# Patient Record
Sex: MALE | Race: OTHER | HISPANIC OR LATINO | Employment: FULL TIME | URBAN - METROPOLITAN AREA
[De-identification: names, ages, dates, MRNs, and addresses within clinical notes are randomized per-mention and may not be internally consistent; named-entity substitution may affect disease eponyms.]

---

## 2019-01-24 ENCOUNTER — OCCUPATIONAL HEALTH (OUTPATIENT)
Dept: URGENT CARE | Facility: CLINIC | Age: 54
End: 2019-01-24

## 2019-01-24 PROCEDURE — 80305 DRUG TEST PRSMV DIR OPT OBS: CPT | Mod: S$GLB,,, | Performed by: NURSE PRACTITIONER

## 2019-01-24 PROCEDURE — 80305 PR NON-DOT DRUG SCREENS: ICD-10-PCS | Mod: S$GLB,,, | Performed by: NURSE PRACTITIONER

## 2019-06-17 ENCOUNTER — OCCUPATIONAL HEALTH (OUTPATIENT)
Dept: URGENT CARE | Facility: CLINIC | Age: 54
End: 2019-06-17

## 2019-06-17 DIAGNOSIS — Z02.1 PRE-EMPLOYMENT EXAMINATION: Primary | ICD-10-CM

## 2019-06-17 PROCEDURE — 80305 DRUG TEST PRSMV DIR OPT OBS: CPT | Mod: S$GLB,,, | Performed by: NURSE PRACTITIONER

## 2019-06-17 PROCEDURE — 99499 UNLISTED E&M SERVICE: CPT | Mod: S$GLB,,, | Performed by: NURSE PRACTITIONER

## 2019-06-17 PROCEDURE — 80305 PR NON-DOT DRUG SCREENS: ICD-10-PCS | Mod: S$GLB,,, | Performed by: NURSE PRACTITIONER

## 2019-06-17 PROCEDURE — 99499 PHYSICAL, BASIC COMPLEXITY: ICD-10-PCS | Mod: S$GLB,,, | Performed by: NURSE PRACTITIONER

## 2023-11-25 ENCOUNTER — OFFICE VISIT (OUTPATIENT)
Dept: URGENT CARE | Facility: CLINIC | Age: 58
End: 2023-11-25

## 2023-11-25 VITALS
HEIGHT: 68 IN | WEIGHT: 206.38 LBS | SYSTOLIC BLOOD PRESSURE: 137 MMHG | RESPIRATION RATE: 19 BRPM | OXYGEN SATURATION: 98 % | TEMPERATURE: 98 F | HEART RATE: 75 BPM | DIASTOLIC BLOOD PRESSURE: 74 MMHG | BODY MASS INDEX: 31.28 KG/M2

## 2023-11-25 DIAGNOSIS — I10 ELEVATED BLOOD PRESSURE READING WITH DIAGNOSIS OF HYPERTENSION: Primary | ICD-10-CM

## 2023-11-25 PROCEDURE — 99203 PR OFFICE/OUTPT VISIT, NEW, LEVL III, 30-44 MIN: ICD-10-PCS | Mod: TIER,S$GLB,, | Performed by: PHYSICIAN ASSISTANT

## 2023-11-25 PROCEDURE — 99203 OFFICE O/P NEW LOW 30 MIN: CPT | Mod: TIER,S$GLB,, | Performed by: PHYSICIAN ASSISTANT

## 2023-11-25 RX ORDER — NEOMYCIN SULFATE, POLYMYXIN B SULFATE AND DEXAMETHASONE 3.5; 10000; 1 MG/ML; [USP'U]/ML; MG/ML
1 SUSPENSION/ DROPS OPHTHALMIC 4 TIMES DAILY
COMMUNITY
Start: 2023-08-22

## 2023-11-25 RX ORDER — AMLODIPINE BESYLATE 5 MG/1
5 TABLET ORAL DAILY
Qty: 30 TABLET | Refills: 0 | Status: SHIPPED | OUTPATIENT
Start: 2023-11-25 | End: 2023-12-25

## 2023-11-25 NOTE — PATIENT INSTRUCTIONS
Recommend [facility] or [facility] who will provide services on a sliding scale.     Discussed amlodipine (norvasc).  Signs of an allergic reaction, like rash; hives; itching; red, swollen, blistered, or peeling skin with or without fever; wheezing; tightness in the chest or throat; trouble breathing, swallowing, or talking; unusual hoarseness; or swelling of the mouth, face, lips, tongue, or throat.  Signs of liver problems like dark urine, feeling tired, not hungry, upset stomach or stomach pain, light-colored stools, throwing up, or yellow skin or eyes.  Very bad dizziness or passing out.  Chest pain that is new or worse.  Fast or abnormal heartbeat.  Swelling.  Stiff muscles, shakiness, or muscle movements that are not normal.    If your blood pressure was elevated during your visit and this was discussed with you, please obtain a home blood pressure cuff and take your blood pressure once daily for 2-4 weeks, record values and follow up with your PCP. If you were started on blood pressure medication today, ensure you obtain a follow up appointment with your PCP in 5-7 days for a re-check, if you develop shortness of breath, severe headache, weakness, chest pain, vision problems after leaving urgent care, call 911 and go to the ER immediately.         Please remember that you have received care at an urgent care today. Urgent cares are not emergency rooms and are not equipped to handle life threatening emergencies and cannot rule in or out certain medical conditions and you may be released before all of your medical problems are known or treated. Please arrange follow up with your primary care physician or speciality clinic  within 2-5 days if your signs and symptoms have not resolved or worsen. Patient can call our Referral Hotline at [phone] to make an appointment.    Please return here or go to the Emergency Department for any concerns or worsening of condition.Patient was  educated on signs/symptoms that would warrant emergent medical attention. Patient verbalized understanding.  Signs of heart attack:  Chest pain  Pain in other areas of the upper body (either or both arms, jaw, neck, etc.)  Trouble breathing  Fast heartbeat  Feeling dizzy  Signs of stroke:  Sudden numbness or weakness of the face, arm, or leg, especially on one side of the body  Sudden confusion, trouble speaking or understanding  Sudden trouble seeing in one or both eyes  Sudden trouble walking, dizziness, loss of balance or coordination  Sudden severe headache with no known cause  Face droops on one side  Call your doctor if you have:  Blood pressure that is 20 points higher than your normal top or bottom number  Two blood pressure readings higher than 180/120  Very bad headache  Confusion  Sudden change in hearing or eyesight  Nosebleed

## 2023-11-25 NOTE — PROGRESS NOTES
"Subjective:      Patient ID: Shai Olvera is a 58 y.o. male.    Vitals:  height is 5' 8" (1.727 m) and weight is 93.6 kg (206 lb 5.6 oz). His oral temperature is 97.9 °F (36.6 °C). His blood pressure is 137/74 and his pulse is 75. His respiration is 19 and oxygen saturation is 98%.     Chief Complaint: Hypertension    Shai Olvera is a 58 y.o. male who complains of  concerns of high BP. Pt stated he has been on BP medication about 2 years ago, but his PCP  and he hasn't been on medication since. He was on his medication for 1 year. He was at [facility] this morning and his BP reading was 186/119 and he got concerned and came in today. He had an episode of blurry vision, dizzy and nausea on Thursday. Reports 2nd episode on Friday with headaches,dizziness, and nausea.       He is present with his wife who is helping to translate. Pt speaks english. They do not have a blood pressure monitor at home. Wife states she has been trying to get him to quit smoking. He smokes 1 pack a day.       Patient denies chest pain, SOB, HA, fever, chills, cough, N/V/D, diaphoresis, jaw pain, back pain, lower extremity pain/swelling, recent travel, recent surgery, recent prolonged immobilization. Pt denies experiencing any ripping or tearing sensation in the chest, back, abdomen or pelvis.      Hypertension  This is a new problem. The current episode started in the past 7 days. The problem is unchanged. The problem is uncontrolled. Associated symptoms include blurred vision and headaches. Pertinent negatives include no anxiety, chest pain, malaise/fatigue, neck pain, orthopnea, palpitations, peripheral edema, PND, shortness of breath or sweats. There are no associated agents to hypertension. Risk factors for coronary artery disease include smoking/tobacco exposure and male gender. Compliance problems include medication cost (PCP ).        Constitution: Negative for chills and fever.   HENT:  Negative for congestion, sore throat, " trouble swallowing and voice change.    Neck: Negative for neck pain and neck stiffness.   Cardiovascular:  Negative for chest pain, leg swelling, palpitations, sob on exertion and passing out.   Eyes:  Positive for blurred vision. Negative for eye discharge, eye redness, vision loss, double vision and eyelid swelling.   Respiratory:  Negative for sleep apnea, chest tightness, cough, sputum production, COPD, shortness of breath, wheezing and asthma.    Gastrointestinal:  Negative for abdominal pain, nausea and vomiting.   Genitourinary:  Negative for urine decreased.   Skin:  Negative for rash.   Allergic/Immunologic: Negative for environmental allergies, seasonal allergies, asthma and chronic cough.   Neurological:  Positive for dizziness, light-headedness and headaches. Negative for history of vertigo, passing out, facial drooping, speech difficulty, coordination disturbances, loss of balance, history of migraines, altered mental status, numbness, tingling and tremors.   Psychiatric/Behavioral:  Negative for altered mental status.       Objective:     Physical Exam   Constitutional: He is oriented to person, place, and time. He appears well-developed. He is cooperative.  Non-toxic appearance. He does not appear ill. No distress.      Comments:Patient is awake and alert, sitting up in exam chair, speaking and answering in complete sentences, in no signs of acute distress   normal  HENT:   Head: Normocephalic and atraumatic.   Ears:   Right Ear: Hearing, tympanic membrane, external ear and ear canal normal.   Left Ear: Hearing, tympanic membrane, external ear and ear canal normal.   Nose: Nose normal. No mucosal edema, rhinorrhea or nasal deformity. No epistaxis. Right sinus exhibits no maxillary sinus tenderness and no frontal sinus tenderness. Left sinus exhibits no maxillary sinus tenderness and no frontal sinus tenderness.   Mouth/Throat: Uvula is midline, oropharynx is clear and moist and mucous membranes are  normal. Mucous membranes are moist. No trismus in the jaw. Normal dentition. No uvula swelling. No oropharyngeal exudate or posterior oropharyngeal erythema. Tonsils are 2+ on the right. Tonsils are 2+ on the left. No tonsillar exudate. Oropharynx is clear.   Eyes: Conjunctivae and lids are normal. Pupils are equal, round, and reactive to light. Right eye exhibits no discharge. Left eye exhibits no discharge. No scleral icterus. Extraocular movement intact   Neck: Trachea normal and phonation normal. Neck supple.   Cardiovascular: Normal rate, regular rhythm, normal heart sounds and normal pulses.   Pulmonary/Chest: Effort normal and breath sounds normal. No respiratory distress.   Abdominal: Normal appearance.   Musculoskeletal: Normal range of motion.         General: No deformity. Normal range of motion.   Neurological: no focal deficit. He is alert and oriented to person, place, and time. He has normal motor skills, normal sensation and intact cranial nerves (2-12). He exhibits normal muscle tone. Gait and coordination normal. Coordination normal. GCS eye subscore is 4. GCS verbal subscore is 5. GCS motor subscore is 6.   Skin: Skin is warm, dry, intact, not diaphoretic and not pale.   Psychiatric: His speech is normal and behavior is normal. Mood, judgment and thought content normal.   Nursing note and vitals reviewed.chaperone present       Vision Screening    Right eye Left eye Both eyes   Without correction 20/30 20/25 20/25   With correction          Assessment:     1. Elevated blood pressure reading with diagnosis of hypertension      Patient presents with clinical exam findings and history consistent with above.      On exam, patient is nontoxic appearing and vitals are stable.        Diagnostic testing results were reviewed and discussed with patient/guardian.   Tests ordered in clinic: None  Previous progress notes/admissions/labs and medications were reviewed.      Plan:       Elevated blood pressure  "reading with diagnosis of hypertension  -     Ambulatory referral/consult to Internal Medicine  -     amLODIPine (NORVASC) 5 MG tablet; Take 1 tablet (5 mg total) by mouth once daily.  Dispense: 30 tablet; Refill: 0             Additional MDM:     Heart Failure Score:   COPD = No              1) See orders for this visit as documented in the electronic medical record.  2) Symptomatic therapy suggested: ,push fluids.   3) Call or return to clinic prn if these symptoms worsen or fail to improve as anticipated.    Discussed results/diagnosis/plan with patient in clinic.  We had shared decision making for patient's treatment. Patient verbalized understanding and in agreement with current treatment plan.     Patient was instructed to return for re-evaluation with urgent care or PCP for continued outpatient workup and management if symptoms do not improve/worsening symptoms. Strict ED versus clinic precautions given in depth.    Discharge and follow-up instructions given verbally/printed with the patient who expressed understanding. The instructions and results are also available on [employer].              Fabienne Cox PA-C (Jackie)          Patient Instructions   Recommend [facility] or [facility] who will provide services on a sliding scale.     Discussed amlodipine (norvasc).  Signs of an allergic reaction, like rash; hives; itching; red, swollen, blistered, or peeling skin with or without fever; wheezing; tightness in the chest or throat; trouble breathing, swallowing, or talking; unusual hoarseness; or swelling of the mouth, face, lips, tongue, or throat.  Signs of liver problems like dark urine, feeling tired, not hungry, upset stomach or stomach pain, light-colored stools, throwing up, or yellow skin or eyes.  Very bad dizziness or passing out.  Chest pain that is new or worse.  Fast or abnormal heartbeat.  Swelling.  Stiff muscles, shakiness, or muscle movements that are not normal.    If " your blood pressure was elevated during your visit and this was discussed with you, please obtain a home blood pressure cuff and take your blood pressure once daily for 2-4 weeks, record values and follow up with your PCP. If you were started on blood pressure medication today, ensure you obtain a follow up appointment with your PCP in 5-7 days for a re-check, if you develop shortness of breath, severe headache, weakness, chest pain, vision problems after leaving urgent care, call 911 and go to the ER immediately.         Please remember that you have received care at an urgent care today. Urgent cares are not emergency rooms and are not equipped to handle life threatening emergencies and cannot rule in or out certain medical conditions and you may be released before all of your medical problems are known or treated. Please arrange follow up with your primary care physician or speciality clinic  within 2-5 days if your signs and symptoms have not resolved or worsen. Patient can call our Referral Hotline at [phone] to make an appointment.    Please return here or go to the Emergency Department for any concerns or worsening of condition.Patient was educated on signs/symptoms that would warrant emergent medical attention. Patient verbalized understanding.  Signs of heart attack:  Chest pain  Pain in other areas of the upper body (either or both arms, jaw, neck, etc.)  Trouble breathing  Fast heartbeat  Feeling dizzy  Signs of stroke:  Sudden numbness or weakness of the face, arm, or leg, especially on one side of the body  Sudden confusion, trouble speaking or understanding  Sudden trouble seeing in one or both eyes  Sudden trouble walking, dizziness, loss of balance or coordination  Sudden severe headache with no known cause  Face droops on one side  Call your doctor if you have:  Blood pressure that is 20 points higher than your normal top or bottom number  Two blood pressure readings higher than 180/120  Very  bad headache  Confusion  Sudden change in hearing or eyesight  Nosebleed

## 2023-12-27 ENCOUNTER — TELEPHONE (OUTPATIENT)
Dept: INTERNAL MEDICINE | Facility: CLINIC | Age: 58
End: 2023-12-27
Payer: COMMERCIAL

## 2023-12-27 NOTE — TELEPHONE ENCOUNTER
Spoke with Pt wife. Stated that there was no available appointment. Our recommendation, with previous symptoms stated, is to go to ER or urgent care. Pt stated that he would like to establish with Dr Moe. They will call back when they would like to set up the appointment.

## 2024-01-10 ENCOUNTER — HOSPITAL ENCOUNTER (OUTPATIENT)
Dept: RADIOLOGY | Facility: HOSPITAL | Age: 59
Discharge: HOME | End: 2024-01-10
Attending: FAMILY MEDICINE
Payer: COMMERCIAL

## 2024-01-10 ENCOUNTER — OFFICE VISIT (OUTPATIENT)
Dept: FAMILY MEDICINE | Facility: CLINIC | Age: 59
End: 2024-01-10
Payer: COMMERCIAL

## 2024-01-10 VITALS
HEART RATE: 83 BPM | WEIGHT: 204.13 LBS | HEIGHT: 68 IN | OXYGEN SATURATION: 98 % | BODY MASS INDEX: 30.94 KG/M2 | DIASTOLIC BLOOD PRESSURE: 62 MMHG | SYSTOLIC BLOOD PRESSURE: 120 MMHG

## 2024-01-10 DIAGNOSIS — Z00.01 ENCOUNTER FOR GENERAL ADULT MEDICAL EXAMINATION WITH ABNORMAL FINDINGS: Primary | ICD-10-CM

## 2024-01-10 DIAGNOSIS — J06.9 VIRAL URI WITH COUGH: ICD-10-CM

## 2024-01-10 DIAGNOSIS — F17.200 TOBACCO USE DISORDER: ICD-10-CM

## 2024-01-10 DIAGNOSIS — H91.8X3 OTHER SPECIFIED HEARING LOSS OF BOTH EARS: ICD-10-CM

## 2024-01-10 DIAGNOSIS — Z23 IMMUNIZATION DUE: ICD-10-CM

## 2024-01-10 DIAGNOSIS — R06.09 DYSPNEA ON EXERTION: ICD-10-CM

## 2024-01-10 DIAGNOSIS — R42 VERTIGO: ICD-10-CM

## 2024-01-10 DIAGNOSIS — I10 PRIMARY HYPERTENSION: ICD-10-CM

## 2024-01-10 DIAGNOSIS — Z11.4 ENCOUNTER FOR SCREENING FOR HIV: ICD-10-CM

## 2024-01-10 DIAGNOSIS — Z12.5 SCREENING FOR PROSTATE CANCER: ICD-10-CM

## 2024-01-10 DIAGNOSIS — Z11.59 NEED FOR HEPATITIS C SCREENING TEST: ICD-10-CM

## 2024-01-10 DIAGNOSIS — Z12.11 COLON CANCER SCREENING: ICD-10-CM

## 2024-01-10 PROBLEM — H91.93 BILATERAL HEARING LOSS: Status: ACTIVE | Noted: 2024-01-10

## 2024-01-10 PROCEDURE — 1159F MED LIST DOCD IN RCRD: CPT | Mod: CPTII,S$GLB,, | Performed by: FAMILY MEDICINE

## 2024-01-10 PROCEDURE — 90471 IMMUNIZATION ADMIN: CPT | Mod: S$GLB,,, | Performed by: FAMILY MEDICINE

## 2024-01-10 PROCEDURE — 99213 OFFICE O/P EST LOW 20 MIN: CPT | Mod: 25,S$GLB,, | Performed by: FAMILY MEDICINE

## 2024-01-10 PROCEDURE — 3008F BODY MASS INDEX DOCD: CPT | Mod: CPTII,S$GLB,, | Performed by: FAMILY MEDICINE

## 2024-01-10 PROCEDURE — 99396 PREV VISIT EST AGE 40-64: CPT | Mod: 25,S$GLB,, | Performed by: FAMILY MEDICINE

## 2024-01-10 PROCEDURE — 93005 ELECTROCARDIOGRAM TRACING: CPT | Mod: S$GLB,,, | Performed by: FAMILY MEDICINE

## 2024-01-10 PROCEDURE — 71046 X-RAY EXAM CHEST 2 VIEWS: CPT | Mod: 26,,, | Performed by: RADIOLOGY

## 2024-01-10 PROCEDURE — 90715 TDAP VACCINE 7 YRS/> IM: CPT | Mod: S$GLB,,, | Performed by: FAMILY MEDICINE

## 2024-01-10 PROCEDURE — 71046 X-RAY EXAM CHEST 2 VIEWS: CPT | Mod: TC,FY,PO

## 2024-01-10 PROCEDURE — 3078F DIAST BP <80 MM HG: CPT | Mod: CPTII,S$GLB,, | Performed by: FAMILY MEDICINE

## 2024-01-10 PROCEDURE — 99999 PR PBB SHADOW E&M-NEW PATIENT-LVL V: CPT | Mod: PBBFAC,,, | Performed by: FAMILY MEDICINE

## 2024-01-10 PROCEDURE — 93010 ELECTROCARDIOGRAM REPORT: CPT | Mod: S$GLB,,, | Performed by: INTERNAL MEDICINE

## 2024-01-10 PROCEDURE — 3074F SYST BP LT 130 MM HG: CPT | Mod: CPTII,S$GLB,, | Performed by: FAMILY MEDICINE

## 2024-01-10 PROCEDURE — 1160F RVW MEDS BY RX/DR IN RCRD: CPT | Mod: CPTII,S$GLB,, | Performed by: FAMILY MEDICINE

## 2024-01-10 RX ORDER — NEOMYCIN SULFATE, POLYMYXIN B SULFATE AND DEXAMETHASONE 3.5; 10000; 1 MG/ML; [USP'U]/ML; MG/ML
1 SUSPENSION/ DROPS OPHTHALMIC 4 TIMES DAILY
COMMUNITY
Start: 2023-08-22 | End: 2024-01-10

## 2024-01-10 RX ORDER — METHYLPREDNISOLONE 4 MG/1
TABLET ORAL
Qty: 21 EACH | Refills: 0 | Status: SHIPPED | OUTPATIENT
Start: 2024-01-10 | End: 2024-01-31

## 2024-01-10 RX ORDER — AMLODIPINE BESYLATE 5 MG/1
5 TABLET ORAL DAILY
Qty: 90 TABLET | Refills: 3 | Status: SHIPPED | OUTPATIENT
Start: 2024-01-10

## 2024-01-10 RX ORDER — AMLODIPINE BESYLATE 5 MG/1
5 TABLET ORAL
COMMUNITY
Start: 2023-11-25 | End: 2024-01-10 | Stop reason: SDUPTHER

## 2024-01-10 RX ORDER — PROMETHAZINE HYDROCHLORIDE AND DEXTROMETHORPHAN HYDROBROMIDE 6.25; 15 MG/5ML; MG/5ML
5 SYRUP ORAL EVERY 6 HOURS PRN
Qty: 240 ML | Refills: 0 | Status: SHIPPED | OUTPATIENT
Start: 2024-01-10 | End: 2024-01-31

## 2024-01-10 NOTE — PROGRESS NOTES
"Subjective:         Patient ID: Shai Olvera is a 58 y.o. male.    Chief Complaint: Annual Exam    Patient Active Problem List   Diagnosis    Tobacco use disorder    Primary hypertension    Bilateral hearing loss      HPI    Shai is a 58 y.o. male who presents today for annual exam, establish care.     Reports that Thanksgiving he started with cold symptoms and then started with vertigo as well as worsening of chronic hearing loss.   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   He reports that he now unable to do heavy work without chest tightness and heavy breathing. Reports pressure on his chest as well.     Long term smoker, likely long term HTN without regular meds (recently started in urgent care on amlodipine 5mg).   Seen urgent care in 11/2023.   Can't recall if Xmas or New Year's but had URI symptoms, now with ongoing cough. No recent fevers, no productive cough.     Review of Systems   All other systems reviewed and are negative.       Objective:     Vitals:    01/10/24 0948   BP: 120/62   BP Location: Right arm   Patient Position: Sitting   BP Method: Medium (Manual)   Pulse: 83   SpO2: 98%   Weight: 92.6 kg (204 lb 2.3 oz)   Height: 5' 8" (1.727 m)         Physical Exam  Vitals and nursing note reviewed.   Constitutional:       General: He is not in acute distress.     Appearance: Normal appearance. He is well-developed. He is not ill-appearing, toxic-appearing or diaphoretic.   HENT:      Head: Normocephalic and atraumatic.      Nose:      Comments: TM: appear normal BL  Nasal congestion noted   Cobblestoning and pharyngeal erythema without exudate noted  No adenopathy  Full ROM of neck    Eyes:      General: No scleral icterus.     Conjunctiva/sclera: Conjunctivae normal.      Pupils: Pupils are equal, round, and reactive to light.   Cardiovascular:      Rate and Rhythm: Normal rate and regular rhythm.   Pulmonary:      Effort: Pulmonary effort is normal. No " respiratory distress.      Breath sounds: Normal breath sounds.   Abdominal:      Palpations: Abdomen is soft.      Tenderness: There is no abdominal tenderness.   Musculoskeletal:      Cervical back: Normal range of motion and neck supple.   Skin:     General: Skin is warm.      Coloration: Skin is not pale.      Findings: No rash.   Neurological:      Mental Status: He is alert and oriented to person, place, and time. Mental status is at baseline.   Psychiatric:         Attention and Perception: Attention and perception normal.         Mood and Affect: Mood and affect normal.         Speech: Speech normal.         Behavior: Behavior normal.         Thought Content: Thought content normal.         Cognition and Memory: Cognition and memory normal.         Judgment: Judgment normal.       Assessment:       1. Encounter for general adult medical examination with abnormal findings    2. Primary hypertension    3. Dyspnea on exertion    4. Tobacco use disorder    5. Need for hepatitis C screening test    6. Encounter for screening for HIV    7. Colon cancer screening    8. Screening for prostate cancer    9. Vertigo    10. Other specified hearing loss of both ears    11. Immunization due    12. Viral URI with cough        Plan:   Recent relevant labs results reviewed with patient.         1. Encounter for general adult medical examination with abnormal findings  -     Urinalysis, Reflex to Urine Culture Urine, Clean Catch; Future; Expected date: 01/10/2024  -     Hepatic Function Panel; Future; Expected date: 01/10/2024  -     Renal Function Panel; Future; Expected date: 01/10/2024  -     Lipid Panel; Future; Expected date: 01/10/2024  -     TSH; Future; Expected date: 01/10/2024  -     Hemoglobin A1C; Future; Expected date: 01/10/2024  -     CBC Auto Differential; Future; Expected date: 01/10/2024  - Risk and age appropriate anticipatory guidance.  reviewed and updated. Recommendations discussed with patient as  appropriate.     2. Primary hypertension  -     amLODIPine (NORVASC) 5 MG tablet; Take 1 tablet (5 mg total) by mouth once daily.  Dispense: 90 tablet; Refill: 3  -     IN OFFICE EKG 12-LEAD (to Muse)  - Chronic health condition is stable and controlled. Continue current medication regimen and relevant lifestyle modifications. Necessary medication refills addressed. Routine ongoing surveillance monitoring.     3. Dyspnea on exertion  -     Stress Echo Which stress agent will be used? Treadmill Exercise; Color Flow Doppler? No; Future  -     Complete PFT w/ bronchodilator; Future  -     X-Ray Chest PA And Lateral; Future; Expected date: 01/10/2024  -     IN OFFICE EKG 12-LEAD (to Muse)  Work up - as above    4. Tobacco use disorder  -     Ambulatory referral/consult to Smoking Cessation Program; Future; Expected date: 01/17/2024    5. Need for hepatitis C screening test  -     Hepatitis C Antibody; Future; Expected date: 01/10/2024    6. Encounter for screening for HIV  -     HIV 1/2 Ag/Ab (4th Gen); Future; Expected date: 01/10/2024    7. Colon cancer screening  -     Fecal Immunochemical Test (iFOBT); Future; Expected date: 01/10/2024    8. Screening for prostate cancer  -     PSA, Screening; Future; Expected date: 01/10/2024    9. Vertigo  -     Ambulatory referral/consult to Audiology; Future; Expected date: 01/17/2024  -     Ambulatory referral/consult to ENT; Future; Expected date: 01/17/2024  -     CT Head Without Contrast; Future; Expected date: 01/10/2024  Imaging given hearing worsening recently without clear cerumen, impaction or middle ear fluid.     10. Other specified hearing loss of both ears  -     Ambulatory referral/consult to Audiology; Future; Expected date: 01/17/2024  -     Ambulatory referral/consult to ENT; Future; Expected date: 01/17/2024  -     CT Head Without Contrast; Future; Expected date: 01/10/2024  Needs hearing eval for chronic hearing loss/impairment.   Encourage protective wear for  hearing    11. Immunization due  -     (In Office Administered) Tdap Vaccine    12. Viral URI with cough  -     methylPREDNISolone (MEDROL DOSEPACK) 4 mg tablet; use as directed  Dispense: 21 each; Refill: 0  -     promethazine-dextromethorphan (PROMETHAZINE-DM) 6.25-15 mg/5 mL Syrp; Take 5 mLs by mouth every 6 (six) hours as needed (cough).  Dispense: 240 mL; Refill: 0  Persistent symptoms with initial viral URI symptoms resolved. CTAB. No wheezing or stridor, but likely has degree of COPD with prolonged recovery course to viral URI likely. No clinical indication for abx at this time. Follow up CXR     Patient's questions answered. Plan reviewed with patient at the end of visit. Relevant precautions to chief complaint and reasons to seek further medical care or to contact the office sooner reviewed with patient.     Follow up in about 3 weeks (around 1/31/2024) for Results Review.

## 2024-01-24 ENCOUNTER — HOSPITAL ENCOUNTER (OUTPATIENT)
Dept: RADIOLOGY | Facility: HOSPITAL | Age: 59
Discharge: HOME | End: 2024-01-24
Attending: FAMILY MEDICINE
Payer: COMMERCIAL

## 2024-01-24 ENCOUNTER — HOSPITAL ENCOUNTER (OUTPATIENT)
Dept: CARDIOLOGY | Facility: HOSPITAL | Age: 59
Discharge: HOME | End: 2024-01-24
Attending: FAMILY MEDICINE
Payer: COMMERCIAL

## 2024-01-24 ENCOUNTER — HOSPITAL ENCOUNTER (OUTPATIENT)
Dept: PULMONOLOGY | Facility: HOSPITAL | Age: 59
Discharge: HOME | End: 2024-01-24
Attending: FAMILY MEDICINE
Payer: COMMERCIAL

## 2024-01-24 DIAGNOSIS — R06.09 DYSPNEA ON EXERTION: ICD-10-CM

## 2024-01-24 DIAGNOSIS — H91.8X3 OTHER SPECIFIED HEARING LOSS OF BOTH EARS: ICD-10-CM

## 2024-01-24 DIAGNOSIS — R42 VERTIGO: ICD-10-CM

## 2024-01-24 LAB
CV STRESS BASE HR: 79 BPM
DIASTOLIC BLOOD PRESSURE: 66 MMHG
EJECTION FRACTION: 55 %
OHS CV CPX 1 MINUTE RECOVERY HEART RATE: 111 BPM
OHS CV CPX 85 PERCENT MAX PREDICTED HEART RATE MALE: 138
OHS CV CPX ESTIMATED METS: 6
OHS CV CPX MAX PREDICTED HEART RATE: 162
OHS CV CPX PATIENT IS FEMALE: 0
OHS CV CPX PATIENT IS MALE: 1
OHS CV CPX PEAK DIASTOLIC BLOOD PRESSURE: 68 MMHG
OHS CV CPX PEAK HEAR RATE: 131 BPM
OHS CV CPX PEAK RATE PRESSURE PRODUCT: NORMAL
OHS CV CPX PEAK SYSTOLIC BLOOD PRESSURE: 152 MMHG
OHS CV CPX PERCENT MAX PREDICTED HEART RATE ACHIEVED: 81
OHS CV CPX RATE PRESSURE PRODUCT PRESENTING: NORMAL
STRESS ECHO POST EXERCISE DUR MIN: 3 MINUTES
STRESS ECHO POST EXERCISE DUR SEC: 55 SECONDS
SYSTOLIC BLOOD PRESSURE: 142 MMHG

## 2024-01-24 PROCEDURE — 94640 AIRWAY INHALATION TREATMENT: CPT | Mod: 59

## 2024-01-24 PROCEDURE — 93351 STRESS TTE COMPLETE: CPT | Mod: 26,,, | Performed by: INTERNAL MEDICINE

## 2024-01-24 PROCEDURE — 70450 CT HEAD/BRAIN W/O DYE: CPT | Mod: TC

## 2024-01-24 PROCEDURE — 70450 CT HEAD/BRAIN W/O DYE: CPT | Mod: 26,,, | Performed by: RADIOLOGY

## 2024-01-24 PROCEDURE — 93351 STRESS TTE COMPLETE: CPT

## 2024-01-24 PROCEDURE — 94729 DIFFUSING CAPACITY: CPT

## 2024-01-24 PROCEDURE — 94010 BREATHING CAPACITY TEST: CPT

## 2024-01-25 LAB
BRPFT: ABNORMAL
DLCO ADJ PRE: 22.17 ML/(MIN*MMHG) (ref 21.03–34.88)
DLCO SINGLE BREATH LLN: 21.03
DLCO SINGLE BREATH PRE REF: 79.3 %
DLCO SINGLE BREATH REF: 27.96
DLCOC SBVA LLN: 2.9
DLCOC SBVA PRE REF: 102.6 %
DLCOC SBVA REF: 4.15
DLCOC SINGLE BREATH LLN: 21.03
DLCOC SINGLE BREATH PRE REF: 79.3 %
DLCOC SINGLE BREATH REF: 27.96
DLCOVA LLN: 2.9
DLCOVA PRE REF: 102.6 %
DLCOVA PRE: 4.25 ML/(MIN*MMHG*L) (ref 2.9–5.4)
DLCOVA REF: 4.15
DLVAADJ PRE: 4.25 ML/(MIN*MMHG*L) (ref 2.9–5.4)
ERVN2 LLN: -16448.83
ERVN2 PRE REF: 27.6 %
ERVN2 PRE: 0.32 L (ref -16448.83–16451.17)
ERVN2 REF: 1.17
FEF 25 75 LLN: 1.47
FEF 25 75 PRE REF: 70.7 %
FEF 25 75 REF: 2.94
FEV1 FVC LLN: 66
FEV1 FVC PRE REF: 95.6 %
FEV1 FVC REF: 78
FEV1 LLN: 2.6
FEV1 PRE REF: 73.8 %
FEV1 REF: 3.43
FRCN2 LLN: 2.49
FRCN2 PRE REF: 40.4 %
FRCN2 REF: 3.48
FVC LLN: 3.37
FVC PRE REF: 77.1 %
FVC REF: 4.4
IVC PRE: 3.21 L (ref 3.37–5.43)
IVC SINGLE BREATH LLN: 3.37
IVC SINGLE BREATH PRE REF: 73 %
IVC SINGLE BREATH REF: 4.4
PEF LLN: 6.42
PEF PRE REF: 57.2 %
PEF REF: 8.93
PRE DLCO: 22.17 ML/(MIN*MMHG) (ref 21.03–34.88)
PRE FEF 25 75: 2.08 L/S (ref 1.47–4.41)
PRE FET 100: 7.99 SEC
PRE FEV1 FVC: 74.63 % (ref 66.2–89.92)
PRE FEV1: 2.53 L (ref 2.6–4.26)
PRE FRC N2: 1.41 L
PRE FVC: 3.39 L (ref 3.37–5.43)
PRE PEF: 5.11 L/S (ref 6.42–11.45)
RVN2 LLN: 1.64
RVN2 PRE REF: 19.5 %
RVN2 PRE: 0.45 L (ref 1.64–2.99)
RVN2 REF: 2.31
RVN2TLCN2 LLN: 27.6
RVN2TLCN2 PRE REF: 31.5 %
RVN2TLCN2 PRE: 11.51 % (ref 27.6–45.56)
RVN2TLCN2 REF: 36.58
TLCN2 LLN: 5.59
TLCN2 PRE REF: 58.1 %
TLCN2 PRE: 3.92 L (ref 5.59–7.89)
TLCN2 REF: 6.74
VA PRE: 5.21 L (ref 6.59–6.59)
VA SINGLE BREATH LLN: 6.59
VA SINGLE BREATH PRE REF: 79.1 %
VA SINGLE BREATH REF: 6.59
VCMAXN2 LLN: 3.37
VCMAXN2 PRE REF: 78.8 %
VCMAXN2 PRE: 3.47 L (ref 3.37–5.43)
VCMAXN2 REF: 4.4

## 2024-01-25 PROCEDURE — 94729 DIFFUSING CAPACITY: CPT | Mod: 26,,, | Performed by: INTERNAL MEDICINE

## 2024-01-25 PROCEDURE — 94727 GAS DIL/WSHOT DETER LNG VOL: CPT | Mod: 26,,, | Performed by: INTERNAL MEDICINE

## 2024-01-25 PROCEDURE — 94010 BREATHING CAPACITY TEST: CPT | Mod: 26,,, | Performed by: INTERNAL MEDICINE

## 2024-01-28 DIAGNOSIS — I25.9 ISCHEMIC HEART DISEASE: ICD-10-CM

## 2024-01-28 DIAGNOSIS — R94.39 ABNORMAL STRESS TEST: Primary | ICD-10-CM

## 2024-01-29 ENCOUNTER — TELEPHONE (OUTPATIENT)
Dept: FAMILY MEDICINE | Facility: CLINIC | Age: 59
End: 2024-01-29
Payer: COMMERCIAL

## 2024-01-31 ENCOUNTER — OFFICE VISIT (OUTPATIENT)
Dept: FAMILY MEDICINE | Facility: CLINIC | Age: 59
End: 2024-01-31
Payer: COMMERCIAL

## 2024-01-31 VITALS
OXYGEN SATURATION: 98 % | HEART RATE: 90 BPM | DIASTOLIC BLOOD PRESSURE: 60 MMHG | BODY MASS INDEX: 30.44 KG/M2 | SYSTOLIC BLOOD PRESSURE: 122 MMHG | HEIGHT: 68 IN | WEIGHT: 200.81 LBS

## 2024-01-31 DIAGNOSIS — R94.39 ABNORMAL STRESS TEST: ICD-10-CM

## 2024-01-31 DIAGNOSIS — E78.2 MIXED HYPERLIPIDEMIA: ICD-10-CM

## 2024-01-31 DIAGNOSIS — Z23 IMMUNIZATION DUE: ICD-10-CM

## 2024-01-31 DIAGNOSIS — Z12.2 SCREENING FOR LUNG CANCER: ICD-10-CM

## 2024-01-31 DIAGNOSIS — I10 PRIMARY HYPERTENSION: Primary | ICD-10-CM

## 2024-01-31 DIAGNOSIS — I25.9 ISCHEMIC HEART DISEASE: ICD-10-CM

## 2024-01-31 DIAGNOSIS — F17.200 TOBACCO USE DISORDER: ICD-10-CM

## 2024-01-31 PROCEDURE — 3008F BODY MASS INDEX DOCD: CPT | Mod: CPTII,S$GLB,, | Performed by: FAMILY MEDICINE

## 2024-01-31 PROCEDURE — 99214 OFFICE O/P EST MOD 30 MIN: CPT | Mod: S$GLB,,, | Performed by: FAMILY MEDICINE

## 2024-01-31 PROCEDURE — 1160F RVW MEDS BY RX/DR IN RCRD: CPT | Mod: CPTII,S$GLB,, | Performed by: FAMILY MEDICINE

## 2024-01-31 PROCEDURE — 3044F HG A1C LEVEL LT 7.0%: CPT | Mod: CPTII,S$GLB,, | Performed by: FAMILY MEDICINE

## 2024-01-31 PROCEDURE — 3074F SYST BP LT 130 MM HG: CPT | Mod: CPTII,S$GLB,, | Performed by: FAMILY MEDICINE

## 2024-01-31 PROCEDURE — 99999 PR PBB SHADOW E&M-EST. PATIENT-LVL IV: CPT | Mod: PBBFAC,,, | Performed by: FAMILY MEDICINE

## 2024-01-31 PROCEDURE — 1159F MED LIST DOCD IN RCRD: CPT | Mod: CPTII,S$GLB,, | Performed by: FAMILY MEDICINE

## 2024-01-31 PROCEDURE — 3078F DIAST BP <80 MM HG: CPT | Mod: CPTII,S$GLB,, | Performed by: FAMILY MEDICINE

## 2024-01-31 RX ORDER — ROSUVASTATIN CALCIUM 20 MG/1
20 TABLET, COATED ORAL NIGHTLY
Qty: 90 TABLET | Refills: 3 | Status: SHIPPED | OUTPATIENT
Start: 2024-01-31 | End: 2024-04-25

## 2024-01-31 NOTE — PROGRESS NOTES
"Subjective:         Patient ID: Shai Olvera is a 58 y.o. male.    Chief Complaint: Follow-up    Patient Active Problem List   Diagnosis    Tobacco use disorder    Primary hypertension    Bilateral hearing loss    Abnormal stress test    Ischemic heart disease      Follow-up      SHAI is a 58 y.o. male who presents today for review of results.    The 10-year ASCVD risk score (Ana HOLLIDAY, et al., 2019) is: 15.9%    Values used to calculate the score:      Age: 58 years      Sex: Male      Is Non- : No      Diabetic: No      Tobacco smoker: Yes      Systolic Blood Pressure: 122 mmHg      Is BP treated: Yes      HDL Cholesterol: 32 mg/dL      Total Cholesterol: 172 mg/dL    Review of Systems   All other systems reviewed and are negative.       Objective:     Vitals:    01/31/24 1115   BP: 122/60   BP Location: Left arm   Patient Position: Sitting   BP Method: Medium (Manual)   Pulse: 90   SpO2: 98%   Weight: 91.1 kg (200 lb 13.4 oz)   Height: 5' 8" (1.727 m)         Physical Exam  Vitals and nursing note reviewed.   Constitutional:       General: He is not in acute distress.     Appearance: Normal appearance. He is not ill-appearing, toxic-appearing or diaphoretic.   HENT:      Head: Normocephalic and atraumatic.   Eyes:      General: No scleral icterus.     Conjunctiva/sclera: Conjunctivae normal.   Cardiovascular:      Rate and Rhythm: Normal rate.   Pulmonary:      Effort: Pulmonary effort is normal. No respiratory distress.   Skin:     Coloration: Skin is not pale.   Neurological:      Mental Status: He is alert. Mental status is at baseline.   Psychiatric:         Attention and Perception: Attention and perception normal.         Mood and Affect: Mood and affect normal.         Speech: Speech normal.         Behavior: Behavior normal.         Cognition and Memory: Cognition and memory normal.         Judgment: Judgment normal.         Assessment:       1. Primary hypertension    2. " Ischemic heart disease    3. Abnormal stress test    4. Mixed hyperlipidemia    5. Tobacco use disorder    6. Screening for lung cancer    7. Immunization due        Plan:   Recent relevant labs results reviewed with patient.         1. Primary hypertension  -     BASIC METABOLIC PANEL; Future; Expected date: 01/31/2024  Controlled, continue amlodipine 5 mg.     2. Ischemic heart disease  3. Abnormal stress test  - Has close cardiology follow up scheduled for further work up.     4. Mixed hyperlipidemia  -     rosuvastatin (CRESTOR) 20 MG tablet; Take 1 tablet (20 mg total) by mouth every evening.  Dispense: 90 tablet; Refill: 3  Start on statin    5. Tobacco use disorder  -     Ambulatory referral/consult to Smoking Cessation Program; Future; Expected date: 02/07/2024    6. Screening for lung cancer  -     CT Chest Lung Screening Low Dose; Future; Expected date: 01/31/2024    7. Immunization due  Supply out at clinic for Pneu 20, declined flu    Patient's questions answered. Plan reviewed with patient at the end of visit. Relevant precautions to chief complaint and reasons to seek further medical care or to contact the office sooner reviewed with patient.     Follow up in about 6 months (around 7/31/2024) for Hypertension Follow-up (BMP).

## 2024-02-05 ENCOUNTER — HOSPITAL ENCOUNTER (OUTPATIENT)
Dept: RADIOLOGY | Facility: HOSPITAL | Age: 59
Discharge: HOME | End: 2024-02-05
Attending: FAMILY MEDICINE
Payer: COMMERCIAL

## 2024-02-05 DIAGNOSIS — Z12.2 SCREENING FOR LUNG CANCER: ICD-10-CM

## 2024-02-05 PROCEDURE — 71271 CT THORAX LUNG CANCER SCR C-: CPT | Mod: TC

## 2024-02-05 PROCEDURE — 71271 CT THORAX LUNG CANCER SCR C-: CPT | Mod: 26,,, | Performed by: RADIOLOGY

## 2024-02-16 PROBLEM — E66.9 RESTRICTIVE LUNG DISEASE SECONDARY TO OBESITY: Status: ACTIVE | Noted: 2024-02-16

## 2024-02-16 PROBLEM — J98.4 RESTRICTIVE LUNG DISEASE SECONDARY TO OBESITY: Status: ACTIVE | Noted: 2024-02-16

## 2024-02-21 ENCOUNTER — OFFICE VISIT (OUTPATIENT)
Dept: CARDIOLOGY | Facility: CLINIC | Age: 59
End: 2024-02-21
Payer: COMMERCIAL

## 2024-02-21 VITALS
WEIGHT: 201.38 LBS | BODY MASS INDEX: 30.52 KG/M2 | OXYGEN SATURATION: 97 % | SYSTOLIC BLOOD PRESSURE: 137 MMHG | HEART RATE: 94 BPM | DIASTOLIC BLOOD PRESSURE: 73 MMHG | HEIGHT: 68 IN

## 2024-02-21 DIAGNOSIS — I25.9 ISCHEMIC HEART DISEASE: ICD-10-CM

## 2024-02-21 DIAGNOSIS — I25.9 ISCHEMIC HEART DISEASE: Primary | ICD-10-CM

## 2024-02-21 DIAGNOSIS — R94.39 ABNORMAL STRESS TEST: ICD-10-CM

## 2024-02-21 PROCEDURE — 3008F BODY MASS INDEX DOCD: CPT | Mod: CPTII,S$GLB,, | Performed by: STUDENT IN AN ORGANIZED HEALTH CARE EDUCATION/TRAINING PROGRAM

## 2024-02-21 PROCEDURE — 93000 ELECTROCARDIOGRAM COMPLETE: CPT | Mod: S$GLB,,, | Performed by: INTERNAL MEDICINE

## 2024-02-21 PROCEDURE — 3078F DIAST BP <80 MM HG: CPT | Mod: CPTII,S$GLB,, | Performed by: STUDENT IN AN ORGANIZED HEALTH CARE EDUCATION/TRAINING PROGRAM

## 2024-02-21 PROCEDURE — 3044F HG A1C LEVEL LT 7.0%: CPT | Mod: CPTII,S$GLB,, | Performed by: STUDENT IN AN ORGANIZED HEALTH CARE EDUCATION/TRAINING PROGRAM

## 2024-02-21 PROCEDURE — 1160F RVW MEDS BY RX/DR IN RCRD: CPT | Mod: CPTII,S$GLB,, | Performed by: STUDENT IN AN ORGANIZED HEALTH CARE EDUCATION/TRAINING PROGRAM

## 2024-02-21 PROCEDURE — 3075F SYST BP GE 130 - 139MM HG: CPT | Mod: CPTII,S$GLB,, | Performed by: STUDENT IN AN ORGANIZED HEALTH CARE EDUCATION/TRAINING PROGRAM

## 2024-02-21 PROCEDURE — 1159F MED LIST DOCD IN RCRD: CPT | Mod: CPTII,S$GLB,, | Performed by: STUDENT IN AN ORGANIZED HEALTH CARE EDUCATION/TRAINING PROGRAM

## 2024-02-21 PROCEDURE — 99999 PR PBB SHADOW E&M-EST. PATIENT-LVL III: CPT | Mod: PBBFAC,,, | Performed by: STUDENT IN AN ORGANIZED HEALTH CARE EDUCATION/TRAINING PROGRAM

## 2024-02-21 PROCEDURE — 99205 OFFICE O/P NEW HI 60 MIN: CPT | Mod: S$GLB,,, | Performed by: STUDENT IN AN ORGANIZED HEALTH CARE EDUCATION/TRAINING PROGRAM

## 2024-02-21 RX ORDER — CARVEDILOL 3.12 MG/1
3.12 TABLET ORAL 2 TIMES DAILY
Qty: 180 TABLET | Refills: 3 | Status: SHIPPED | OUTPATIENT
Start: 2024-02-21 | End: 2025-02-20

## 2024-02-21 RX ORDER — DIPHENHYDRAMINE HCL 50 MG
50 CAPSULE ORAL ONCE
Status: DISCONTINUED | OUTPATIENT
Start: 2024-02-21 | End: 2024-02-23 | Stop reason: HOSPADM

## 2024-02-21 NOTE — PROGRESS NOTES
Subjective:   @Patient ID:  Shai Olvera is a 58 y.o. male who presents for evaluation of No chief complaint on file.      HPI:     59 yo m with hx of HTN, tobacco use 2-->1 ppd here to establish care. Patient c/o chest pain with minimal exertion, described as pressure like and resolves with rest. Associated with SOB. Recently stress echo was positive concerning for CAD. Given symptoms we had a long discussion the need ischemic evaluation.       Positive stress test 01/24      Left Ventricle: There is moderate concentric hypertrophy. There is normal systolic function. Ejection fraction by visual approximation is 55%.    Right Ventricle: Normal right ventricular cavity size. Wall thickness is normal. Right ventricle wall motion  is normal. Systolic function is normal.    Stress Protocol: The patient exercised for 3 minutes 55 seconds on a Jose Daniel protocol, corresponding to a functional capacity of 6 METS, achieving a peak heart rate of 131 bpm, which is 81 % of the age predicted maximum heart rate. The patient experienced non-limiting angina during the test. Their exercise capacity was moderately impaired. The patient reported chest discomfort and shortness of breath during the stress test. The test was stopped because the patient requested it and they experienced shortness of breath.    Baseline ECG: The Baseline ECG reveals sinus rhythm. The baseline ECG has ST and/or T wave abnormalities secondary to hypertrophy.    Stress ECG: There are no arrhythmias during stress. There is normal blood pressure response with stress.    ECG Conclusion: The ECG portion of the study is positive for ischemia. Specificity is reduced secondary to LVH.    Post-stress Impression: The study is consistent with ischemia. Mild inferio, inferio lateral hypokinesia.  Specificity reduced due to significant LVH      Patient Active Problem List    Diagnosis Date Noted    Restrictive lung disease secondary to obesity 02/16/2024     Due to  "obesity or other neuromuscular, extraparenchymal cause. Normal DLCO. PFTs 2024      Abnormal stress test 2024    Ischemic heart disease 2024    Tobacco use disorder 01/10/2024    Primary hypertension 01/10/2024    Bilateral hearing loss 01/10/2024           Right Arm BP - Sittin/72  Left Arm BP - Sittin/73        LABS  CBC  @LABRCNTIP(WBC:3,RBC:3,HGB:3,HCT:3,PLT:3,MCV:3,MCH:3,MCHC:3)@  BMP  @LABRCNTIP(NA:3,K:3,CO2:3,CL:3,BUN:3,Creatinine:3,GLU:3,GFR:3)@    POCT-Glucose  No results found for: "POCTGLUCOSE"    @LABRCNTIP(Calcium:3,MG:3,PHOS:3)@  LFT  @LABRCNTIP(PROT:3,Albumin:3,,Bilitot:3,AST:3,Alkphos:3,ALT:3)@  GFR     COAGS  @LABRCNTIP(PT:3,INR:3,APTT:3)@  CE  @LABRCNTIP(troponini:3,cktotal:3,ckmb:3)@  ABGs  @[id number](PH,PCO2,PO2,HCO3,POCSATURATED,BE)@  BNP  @LABRCNTIP(BNP:3)@    LAST HbA1c  Lab Results   Component Value Date    HGBA1C 5.5 01/10/2024       Lipid panel  Lab Results   Component Value Date    CHOL 172 01/10/2024     Lab Results   Component Value Date    HDL 32 (L) 01/10/2024     Lab Results   Component Value Date    LDLCALC 108.0 01/10/2024     Lab Results   Component Value Date    TRIG 160 (H) 01/10/2024     Lab Results   Component Value Date    CHOLHDL 18.6 (L) 01/10/2024          ROS  +CP  +SOB      Objective:   General: No acute stress  HENT: EOM intact, PERRL, oropharynx clear, mucous membranes clear and moist   Pulmonary: CTAB  Cardiovascular: regular rhythm, nl S1/S2, no murmurs, rubs or gallops  Abdomen: soft, non-tender, no distended no splenomegaly or hepatomegaly   Skin: warm, dry, no erythema, no rashes    Extremities: periph pulses intact, no cyanosis or edema   Neuro: a/ox4, clear speech, follows commands, no weakness or focal neurologic  deficit   Psych: mood and behavior normal       Assessment:     1. Abnormal stress test    2. Ischemic heart disease        Plan:     HTN- BP at goal, asymptomatic, continue current medication   Positive stress test:  will " plan for angiogram      Continue with current medical plan and lifestyle changes.  Return sooner for concerns or questions. If symptoms persist go to the ED  I have reviewed all pertinent data on this patient       I have reviewed the patient's medical history in detail and updated the computerized patient record.    Orders Placed This Encounter   Procedures    Vital signs     Standing Status:   Standing     Number of Occurrences:   1    Verify informed consent, place on front of chart     Standing Status:   Standing     Number of Occurrences:   1    Void on call to Cath Lab     Standing Status:   Standing     Number of Occurrences:   1    Place in Outpatient     Standing Status:   Standing     Number of Occurrences:   1     Order Specific Question:   Diagnosis     Answer:   Abnormal stress test [853128]     Order Specific Question:   Is the Future Attending Known?     Answer:   Yes     Order Specific Question:   Future Attending Provider     Answer:   HOANG LAGOS [[id number]]    Case Request-Cath Lab: Left heart cath     Standing Status:   Standing     Number of Occurrences:   1     Order Specific Question:   CPT Code:     Answer:   AL CATH PLACE/CORONARY ANGIO, IMG SUPER/INTERP [57636]     Order Specific Question:   Medical Necessity:     Answer:   Medically Urgent [101]     Order Specific Question:   Is an on-site pathologist required for this procedure?     Answer:   N/A       Follow up as scheduled. Return sooner for concerns or questions            He expressed verbal understanding and agreed with the plan        Patient's Medications   New Prescriptions    CARVEDILOL (COREG) 3.125 MG TABLET    Take 1 tablet (3.125 mg total) by mouth 2 (two) times daily.   Previous Medications    AMLODIPINE (NORVASC) 5 MG TABLET    Take 1 tablet (5 mg total) by mouth once daily.    NEOMYCIN-POLYMYXIN-DEXAMETHASONE (MAXITROL) 3.5MG/ML-10,000 UNIT/ML-0.1 % DRPS    Place 1 drop into the right eye 4 (four) times daily.     ROSUVASTATIN (CRESTOR) 20 MG TABLET    Take 1 tablet (20 mg total) by mouth every evening.   Modified Medications    No medications on file   Discontinued Medications    No medications on file        Shai Gaytan MD  Cardiovascular Disease [facility]

## 2024-02-21 NOTE — H&P (VIEW-ONLY)
Subjective:   @Patient ID:  Shai Olvera is a 58 y.o. male who presents for evaluation of No chief complaint on file.      HPI:     59 yo m with hx of HTN, tobacco use 2-->1 ppd here to establish care. Patient c/o chest pain with minimal exertion, described as pressure like and resolves with rest. Associated with SOB. Recently stress echo was positive concerning for CAD. Given symptoms we had a long discussion the need ischemic evaluation.       Positive stress test 01/24      Left Ventricle: There is moderate concentric hypertrophy. There is normal systolic function. Ejection fraction by visual approximation is 55%.    Right Ventricle: Normal right ventricular cavity size. Wall thickness is normal. Right ventricle wall motion  is normal. Systolic function is normal.    Stress Protocol: The patient exercised for 3 minutes 55 seconds on a Jose Daniel protocol, corresponding to a functional capacity of 6 METS, achieving a peak heart rate of 131 bpm, which is 81 % of the age predicted maximum heart rate. The patient experienced non-limiting angina during the test. Their exercise capacity was moderately impaired. The patient reported chest discomfort and shortness of breath during the stress test. The test was stopped because the patient requested it and they experienced shortness of breath.    Baseline ECG: The Baseline ECG reveals sinus rhythm. The baseline ECG has ST and/or T wave abnormalities secondary to hypertrophy.    Stress ECG: There are no arrhythmias during stress. There is normal blood pressure response with stress.    ECG Conclusion: The ECG portion of the study is positive for ischemia. Specificity is reduced secondary to LVH.    Post-stress Impression: The study is consistent with ischemia. Mild inferio, inferio lateral hypokinesia.  Specificity reduced due to significant LVH      Patient Active Problem List    Diagnosis Date Noted    Restrictive lung disease secondary to obesity 02/16/2024     Due to  "obesity or other neuromuscular, extraparenchymal cause. Normal DLCO. PFTs 2024      Abnormal stress test 2024    Ischemic heart disease 2024    Tobacco use disorder 01/10/2024    Primary hypertension 01/10/2024    Bilateral hearing loss 01/10/2024           Right Arm BP - Sittin/72  Left Arm BP - Sittin/73        LABS  CBC  @LABRCNTIP(WBC:3,RBC:3,HGB:3,HCT:3,PLT:3,MCV:3,MCH:3,MCHC:3)@  BMP  @LABRCNTIP(NA:3,K:3,CO2:3,CL:3,BUN:3,Creatinine:3,GLU:3,GFR:3)@    POCT-Glucose  No results found for: "POCTGLUCOSE"    @LABRCNTIP(Calcium:3,MG:3,PHOS:3)@  LFT  @LABRCNTIP(PROT:3,Albumin:3,,Bilitot:3,AST:3,Alkphos:3,ALT:3)@  GFR     COAGS  @LABRCNTIP(PT:3,INR:3,APTT:3)@  CE  @LABRCNTIP(troponini:3,cktotal:3,ckmb:3)@  ABGs  @[id number](PH,PCO2,PO2,HCO3,POCSATURATED,BE)@  BNP  @LABRCNTIP(BNP:3)@    LAST HbA1c  Lab Results   Component Value Date    HGBA1C 5.5 01/10/2024       Lipid panel  Lab Results   Component Value Date    CHOL 172 01/10/2024     Lab Results   Component Value Date    HDL 32 (L) 01/10/2024     Lab Results   Component Value Date    LDLCALC 108.0 01/10/2024     Lab Results   Component Value Date    TRIG 160 (H) 01/10/2024     Lab Results   Component Value Date    CHOLHDL 18.6 (L) 01/10/2024          ROS  +CP  +SOB      Objective:   General: No acute stress  HENT: EOM intact, PERRL, oropharynx clear, mucous membranes clear and moist   Pulmonary: CTAB  Cardiovascular: regular rhythm, nl S1/S2, no murmurs, rubs or gallops  Abdomen: soft, non-tender, no distended no splenomegaly or hepatomegaly   Skin: warm, dry, no erythema, no rashes    Extremities: periph pulses intact, no cyanosis or edema   Neuro: a/ox4, clear speech, follows commands, no weakness or focal neurologic  deficit   Psych: mood and behavior normal       Assessment:     1. Abnormal stress test    2. Ischemic heart disease        Plan:     HTN- BP at goal, asymptomatic, continue current medication   Positive stress test:  will " plan for angiogram      Continue with current medical plan and lifestyle changes.  Return sooner for concerns or questions. If symptoms persist go to the ED  I have reviewed all pertinent data on this patient       I have reviewed the patient's medical history in detail and updated the computerized patient record.    Orders Placed This Encounter   Procedures    Vital signs     Standing Status:   Standing     Number of Occurrences:   1    Verify informed consent, place on front of chart     Standing Status:   Standing     Number of Occurrences:   1    Void on call to Cath Lab     Standing Status:   Standing     Number of Occurrences:   1    Place in Outpatient     Standing Status:   Standing     Number of Occurrences:   1     Order Specific Question:   Diagnosis     Answer:   Abnormal stress test [128079]     Order Specific Question:   Is the Future Attending Known?     Answer:   Yes     Order Specific Question:   Future Attending Provider     Answer:   HOANG LAGOS [[id number]]    Case Request-Cath Lab: Left heart cath     Standing Status:   Standing     Number of Occurrences:   1     Order Specific Question:   CPT Code:     Answer:   ND CATH PLACE/CORONARY ANGIO, IMG SUPER/INTERP [02065]     Order Specific Question:   Medical Necessity:     Answer:   Medically Urgent [101]     Order Specific Question:   Is an on-site pathologist required for this procedure?     Answer:   N/A       Follow up as scheduled. Return sooner for concerns or questions            He expressed verbal understanding and agreed with the plan        Patient's Medications   New Prescriptions    CARVEDILOL (COREG) 3.125 MG TABLET    Take 1 tablet (3.125 mg total) by mouth 2 (two) times daily.   Previous Medications    AMLODIPINE (NORVASC) 5 MG TABLET    Take 1 tablet (5 mg total) by mouth once daily.    NEOMYCIN-POLYMYXIN-DEXAMETHASONE (MAXITROL) 3.5MG/ML-10,000 UNIT/ML-0.1 % DRPS    Place 1 drop into the right eye 4 (four) times daily.     ROSUVASTATIN (CRESTOR) 20 MG TABLET    Take 1 tablet (20 mg total) by mouth every evening.   Modified Medications    No medications on file   Discontinued Medications    No medications on file        Shai Gaytan MD  Cardiovascular Disease [facility]

## 2024-02-22 ENCOUNTER — LAB VISIT (OUTPATIENT)
Dept: LAB | Facility: HOSPITAL | Age: 59
End: 2024-02-22
Attending: STUDENT IN AN ORGANIZED HEALTH CARE EDUCATION/TRAINING PROGRAM
Payer: COMMERCIAL

## 2024-02-22 DIAGNOSIS — Z01.810 PRE-OPERATIVE CARDIOVASCULAR EXAMINATION: ICD-10-CM

## 2024-02-22 LAB
ANION GAP SERPL CALC-SCNC: 8 MMOL/L (ref 8–16)
BASOPHILS # BLD AUTO: 0.03 K/UL (ref 0–0.2)
BASOPHILS NFR BLD: 0.3 % (ref 0–1.9)
BUN SERPL-MCNC: 13 MG/DL (ref 6–20)
CALCIUM SERPL-MCNC: 9.2 MG/DL (ref 8.7–10.5)
CHLORIDE SERPL-SCNC: 108 MMOL/L (ref 95–110)
CO2 SERPL-SCNC: 24 MMOL/L (ref 23–29)
CREAT SERPL-MCNC: 0.9 MG/DL (ref 0.5–1.4)
DIFFERENTIAL METHOD BLD: NORMAL
EOSINOPHIL # BLD AUTO: 0.2 K/UL (ref 0–0.5)
EOSINOPHIL NFR BLD: 1.4 % (ref 0–8)
ERYTHROCYTE [DISTWIDTH] IN BLOOD BY AUTOMATED COUNT: 12.8 % (ref 11.5–14.5)
EST. GFR  (NO RACE VARIABLE): >60 ML/MIN/1.73 M^2
GLUCOSE SERPL-MCNC: 96 MG/DL (ref 70–110)
HCT VFR BLD AUTO: 43.9 % (ref 40–54)
HGB BLD-MCNC: 15 G/DL (ref 14–18)
IMM GRANULOCYTES # BLD AUTO: 0.04 K/UL (ref 0–0.04)
IMM GRANULOCYTES NFR BLD AUTO: 0.4 % (ref 0–0.5)
LYMPHOCYTES # BLD AUTO: 2.9 K/UL (ref 1–4.8)
LYMPHOCYTES NFR BLD: 25.3 % (ref 18–48)
MCH RBC QN AUTO: 30.7 PG (ref 27–31)
MCHC RBC AUTO-ENTMCNC: 34.2 G/DL (ref 32–36)
MCV RBC AUTO: 90 FL (ref 82–98)
MONOCYTES # BLD AUTO: 0.9 K/UL (ref 0.3–1)
MONOCYTES NFR BLD: 7.7 % (ref 4–15)
NEUTROPHILS # BLD AUTO: 7.4 K/UL (ref 1.8–7.7)
NEUTROPHILS NFR BLD: 64.9 % (ref 38–73)
NRBC BLD-RTO: 0 /100 WBC
OHS QRS DURATION: 96 MS
OHS QTC CALCULATION: 474 MS
PLATELET # BLD AUTO: 299 K/UL (ref 150–450)
PMV BLD AUTO: 10.9 FL (ref 9.2–12.9)
POTASSIUM SERPL-SCNC: 4.3 MMOL/L (ref 3.5–5.1)
RBC # BLD AUTO: 4.89 M/UL (ref 4.6–6.2)
SODIUM SERPL-SCNC: 140 MMOL/L (ref 136–145)
WBC # BLD AUTO: 11.33 K/UL (ref 3.9–12.7)

## 2024-02-22 PROCEDURE — 80048 BASIC METABOLIC PNL TOTAL CA: CPT | Performed by: STUDENT IN AN ORGANIZED HEALTH CARE EDUCATION/TRAINING PROGRAM

## 2024-02-22 PROCEDURE — 36415 COLL VENOUS BLD VENIPUNCTURE: CPT | Performed by: STUDENT IN AN ORGANIZED HEALTH CARE EDUCATION/TRAINING PROGRAM

## 2024-02-22 PROCEDURE — 85025 COMPLETE CBC W/AUTO DIFF WBC: CPT | Performed by: STUDENT IN AN ORGANIZED HEALTH CARE EDUCATION/TRAINING PROGRAM

## 2024-02-23 ENCOUNTER — HOSPITAL ENCOUNTER (OUTPATIENT)
Facility: HOSPITAL | Age: 59
Discharge: HOME | End: 2024-02-23
Attending: STUDENT IN AN ORGANIZED HEALTH CARE EDUCATION/TRAINING PROGRAM | Admitting: STUDENT IN AN ORGANIZED HEALTH CARE EDUCATION/TRAINING PROGRAM
Payer: COMMERCIAL

## 2024-02-23 VITALS
TEMPERATURE: 98 F | WEIGHT: 201 LBS | RESPIRATION RATE: 16 BRPM | HEIGHT: 68 IN | SYSTOLIC BLOOD PRESSURE: 123 MMHG | OXYGEN SATURATION: 98 % | HEART RATE: 80 BPM | BODY MASS INDEX: 30.46 KG/M2 | DIASTOLIC BLOOD PRESSURE: 64 MMHG

## 2024-02-23 DIAGNOSIS — Z01.810 PRE-OPERATIVE CARDIOVASCULAR EXAMINATION: Primary | ICD-10-CM

## 2024-02-23 DIAGNOSIS — R94.39 ABNORMAL STRESS TEST: ICD-10-CM

## 2024-02-23 DIAGNOSIS — I25.9 ISCHEMIC HEART DISEASE: ICD-10-CM

## 2024-02-23 PROCEDURE — 63600175 PHARM REV CODE 636 W HCPCS: Performed by: STUDENT IN AN ORGANIZED HEALTH CARE EDUCATION/TRAINING PROGRAM

## 2024-02-23 PROCEDURE — 99152 MOD SED SAME PHYS/QHP 5/>YRS: CPT | Mod: ,,, | Performed by: STUDENT IN AN ORGANIZED HEALTH CARE EDUCATION/TRAINING PROGRAM

## 2024-02-23 PROCEDURE — 25500020 PHARM REV CODE 255: Performed by: STUDENT IN AN ORGANIZED HEALTH CARE EDUCATION/TRAINING PROGRAM

## 2024-02-23 PROCEDURE — 93005 ELECTROCARDIOGRAM TRACING: CPT

## 2024-02-23 PROCEDURE — 25000003 PHARM REV CODE 250: Performed by: STUDENT IN AN ORGANIZED HEALTH CARE EDUCATION/TRAINING PROGRAM

## 2024-02-23 PROCEDURE — 93458 L HRT ARTERY/VENTRICLE ANGIO: CPT | Performed by: STUDENT IN AN ORGANIZED HEALTH CARE EDUCATION/TRAINING PROGRAM

## 2024-02-23 PROCEDURE — 93458 L HRT ARTERY/VENTRICLE ANGIO: CPT | Mod: 26,,, | Performed by: STUDENT IN AN ORGANIZED HEALTH CARE EDUCATION/TRAINING PROGRAM

## 2024-02-23 PROCEDURE — 93010 ELECTROCARDIOGRAM REPORT: CPT | Mod: ,,, | Performed by: INTERNAL MEDICINE

## 2024-02-23 PROCEDURE — C1894 INTRO/SHEATH, NON-LASER: HCPCS | Performed by: STUDENT IN AN ORGANIZED HEALTH CARE EDUCATION/TRAINING PROGRAM

## 2024-02-23 PROCEDURE — 99152 MOD SED SAME PHYS/QHP 5/>YRS: CPT | Performed by: STUDENT IN AN ORGANIZED HEALTH CARE EDUCATION/TRAINING PROGRAM

## 2024-02-23 PROCEDURE — C1887 CATHETER, GUIDING: HCPCS | Performed by: STUDENT IN AN ORGANIZED HEALTH CARE EDUCATION/TRAINING PROGRAM

## 2024-02-23 PROCEDURE — C1769 GUIDE WIRE: HCPCS | Performed by: STUDENT IN AN ORGANIZED HEALTH CARE EDUCATION/TRAINING PROGRAM

## 2024-02-23 RX ORDER — LIDOCAINE HYDROCHLORIDE 10 MG/ML
INJECTION INFILTRATION; PERINEURAL
Status: DISCONTINUED | OUTPATIENT
Start: 2024-02-23 | End: 2024-02-23 | Stop reason: HOSPADM

## 2024-02-23 RX ORDER — SODIUM CHLORIDE 9 MG/ML
INJECTION, SOLUTION INTRAVENOUS CONTINUOUS
Status: DISCONTINUED | OUTPATIENT
Start: 2024-02-23 | End: 2024-02-23 | Stop reason: HOSPADM

## 2024-02-23 RX ORDER — FENTANYL CITRATE 50 UG/ML
INJECTION, SOLUTION INTRAMUSCULAR; INTRAVENOUS
Status: DISCONTINUED | OUTPATIENT
Start: 2024-02-23 | End: 2024-02-23 | Stop reason: HOSPADM

## 2024-02-23 RX ORDER — MIDAZOLAM HYDROCHLORIDE 1 MG/ML
INJECTION INTRAMUSCULAR; INTRAVENOUS
Status: DISCONTINUED | OUTPATIENT
Start: 2024-02-23 | End: 2024-02-23 | Stop reason: HOSPADM

## 2024-02-23 RX ORDER — SODIUM CHLORIDE 0.9 % (FLUSH) 0.9 %
10 SYRINGE (ML) INJECTION
Status: DISCONTINUED | OUTPATIENT
Start: 2024-02-23 | End: 2024-02-23 | Stop reason: HOSPADM

## 2024-02-23 RX ORDER — VERAPAMIL HYDROCHLORIDE 2.5 MG/ML
INJECTION, SOLUTION INTRAVENOUS
Status: DISCONTINUED | OUTPATIENT
Start: 2024-02-23 | End: 2024-02-23 | Stop reason: HOSPADM

## 2024-02-23 RX ORDER — FUROSEMIDE 10 MG/ML
20 INJECTION INTRAMUSCULAR; INTRAVENOUS ONCE
Status: DISCONTINUED | OUTPATIENT
Start: 2024-02-23 | End: 2024-02-23 | Stop reason: HOSPADM

## 2024-02-23 RX ORDER — HEPARIN SODIUM 200 [USP'U]/100ML
INJECTION, SOLUTION INTRAVENOUS
Status: DISCONTINUED | OUTPATIENT
Start: 2024-02-23 | End: 2024-02-23 | Stop reason: HOSPADM

## 2024-02-23 RX ORDER — IODIXANOL 320 MG/ML
INJECTION, SOLUTION INTRAVASCULAR
Status: DISCONTINUED | OUTPATIENT
Start: 2024-02-23 | End: 2024-02-23 | Stop reason: HOSPADM

## 2024-02-23 RX ORDER — ACETAMINOPHEN 325 MG/1
650 TABLET ORAL EVERY 4 HOURS PRN
Status: DISCONTINUED | OUTPATIENT
Start: 2024-02-23 | End: 2024-02-23 | Stop reason: HOSPADM

## 2024-02-23 RX ORDER — HEPARIN SODIUM 1000 [USP'U]/ML
INJECTION, SOLUTION INTRAVENOUS; SUBCUTANEOUS
Status: DISCONTINUED | OUTPATIENT
Start: 2024-02-23 | End: 2024-02-23 | Stop reason: HOSPADM

## 2024-02-23 RX ORDER — ONDANSETRON 4 MG/1
8 TABLET, ORALLY DISINTEGRATING ORAL EVERY 8 HOURS PRN
Status: DISCONTINUED | OUTPATIENT
Start: 2024-02-23 | End: 2024-02-23 | Stop reason: HOSPADM

## 2024-02-23 RX ORDER — SODIUM CHLORIDE 9 MG/ML
INJECTION, SOLUTION INTRAVENOUS ONCE
Status: COMPLETED | OUTPATIENT
Start: 2024-02-23 | End: 2024-02-23

## 2024-02-23 RX ADMIN — SODIUM CHLORIDE: 0.9 INJECTION, SOLUTION INTRAVENOUS at 01:02

## 2024-02-23 NOTE — PLAN OF CARE
Pt laying in bed with no complaints. Monitors applied, VSS. Yellow socks on and fall risk reviewed with pt and verbalizes understanding. Procedure and recovery explained to pt and all questions answered.

## 2024-02-23 NOTE — BRIEF OP NOTE
"POST CATH NOTE    HPI:     s/p catheterization secondary to: Positive stress test    Cath Results:  Access: US guided RRA  LM: large, 0% stenosis, ALBERTINA 3- flow  LAD: moderate, 0% stenosis, ALBERTINA 3- flow  LCx:  moderate, 0% stenosis, ALBERTINA 3- flow  RCA: moderate, 0% stenosis, ALBERTINA 3- flow  LVEDP 33    Closure device: Vasc Band  Patient tolerated procedure well, no complications    Post Cath Exam:  /73 (BP Location: Left arm, Patient Position: Lying)   Pulse 74   Temp 97.6 °F (36.4 °C) (Tympanic)   Resp 16   Ht 5' 8" (1.727 m)   Wt 91.2 kg (201 lb)   SpO2 98%   BMI 30.56 kg/m²     Assessment:   No evidence of obstructive CAD  LVEDP 33 mmHg      Plan:   - Patient tolerated procedure well. No immediate complications  - Routine post-cath care  - Routine post cath protocol  - Maximize medical management  - Give a lasix 20 IVP x1    "

## 2024-02-23 NOTE — NURSING
Right radial vasc band removed per protocol. Gauze and tegaderm applied. Site WNL. No swelling, bleeding, oozing, tenderness, or hematoma present. Pt instructed about site and to keep wrist straight.

## 2024-02-23 NOTE — DISCHARGE SUMMARY
[facility] - Cath Lab (Hospital)  Discharge Note  Short Stay    Procedure(s) (LRB):  Left heart cath (Right)  ANGIOGRAM, CORONARY ARTERY (N/A)  Placement of Closure Device      OUTCOME: Condition has improved and patient is now ready for discharge.    No evidence of CAD    DISPOSITION: Home or Self Care    FINAL DIAGNOSIS:  <principal problem not specified>    FOLLOWUP: In clinic    DISCHARGE INSTRUCTIONS:    Discharge Procedure Orders   CBC W/ AUTO DIFFERENTIAL   Standing Status: Future Number of Occurrences: 1 Standing Exp. Date: 04/21/25     BASIC METABOLIC PANEL   Standing Status: Future Number of Occurrences: 1 Standing Exp. Date: 04/21/25        TIME SPENT ON DISCHARGE: 30 minutes

## 2024-02-23 NOTE — DISCHARGE SUMMARY
[facility] - Cath Lab (Hospital)  Discharge Note  Short Stay    Procedure(s) (LRB):  Left heart cath (Right)  ANGIOGRAM, CORONARY ARTERY (N/A)  Placement of Closure Device      OUTCOME: Condition has improved and patient is now ready for discharge.    DISPOSITION: Home or Self Care    FINAL DIAGNOSIS:  <principal problem not specified>    FOLLOWUP: In clinic    DISCHARGE INSTRUCTIONS:    Discharge Procedure Orders   CBC W/ AUTO DIFFERENTIAL   Standing Status: Future Number of Occurrences: 1 Standing Exp. Date: 04/21/25     BASIC METABOLIC PANEL   Standing Status: Future Number of Occurrences: 1 Standing Exp. Date: 04/21/25        TIME SPENT ON DISCHARGE: 20 minutes

## 2024-02-23 NOTE — INTERVAL H&P NOTE
The patient has been examined and the H&P has been reviewed:    I concur with the findings and no changes have occurred since H&P was written.    Procedure risks, benefits and alternative options discussed and understood by patient/family.    - Plan for LHC/Cors +/- PCI +/- LV and aortic pressures +/- ventriculography   - Maintain NPO   - All risks and benefits regarding the procedure have been discussed with the patient in detail including but not limited to bleeding, infection, renal failure, worsening condition, myocardial infarction, stroke, and failure of intent of procedure  - The patient has expressed understanding of all risks and benefits for the procedure  - All questions and concerns have been answered  - Informed consent forms have been explained to the patient and all consents have been signed           There are no hospital problems to display for this patient.

## 2024-02-26 LAB
OHS QRS DURATION: 110 MS
OHS QTC CALCULATION: 481 MS

## 2024-04-25 ENCOUNTER — OFFICE VISIT (OUTPATIENT)
Dept: FAMILY MEDICINE | Facility: CLINIC | Age: 59
End: 2024-04-25
Payer: COMMERCIAL

## 2024-04-25 VITALS
WEIGHT: 206.38 LBS | DIASTOLIC BLOOD PRESSURE: 78 MMHG | HEART RATE: 84 BPM | HEIGHT: 68 IN | SYSTOLIC BLOOD PRESSURE: 132 MMHG | BODY MASS INDEX: 31.28 KG/M2 | OXYGEN SATURATION: 98 %

## 2024-04-25 DIAGNOSIS — J98.4 RESTRICTIVE LUNG DISEASE SECONDARY TO OBESITY: ICD-10-CM

## 2024-04-25 DIAGNOSIS — I10 PRIMARY HYPERTENSION: Primary | ICD-10-CM

## 2024-04-25 DIAGNOSIS — E66.9 RESTRICTIVE LUNG DISEASE SECONDARY TO OBESITY: ICD-10-CM

## 2024-04-25 DIAGNOSIS — R06.09 DYSPNEA ON EXERTION: ICD-10-CM

## 2024-04-25 DIAGNOSIS — Z12.11 COLON CANCER SCREENING: ICD-10-CM

## 2024-04-25 DIAGNOSIS — R94.39 ABNORMAL STRESS TEST: ICD-10-CM

## 2024-04-25 DIAGNOSIS — E78.2 MIXED HYPERLIPIDEMIA: ICD-10-CM

## 2024-04-25 DIAGNOSIS — F17.200 TOBACCO USE DISORDER: ICD-10-CM

## 2024-04-25 DIAGNOSIS — R07.89 ATYPICAL CHEST PAIN: ICD-10-CM

## 2024-04-25 PROBLEM — I25.9 ISCHEMIC HEART DISEASE: Status: RESOLVED | Noted: 2024-01-28 | Resolved: 2024-04-25

## 2024-04-25 PROCEDURE — 1160F RVW MEDS BY RX/DR IN RCRD: CPT | Mod: CPTII,S$GLB,, | Performed by: FAMILY MEDICINE

## 2024-04-25 PROCEDURE — 3008F BODY MASS INDEX DOCD: CPT | Mod: CPTII,S$GLB,, | Performed by: FAMILY MEDICINE

## 2024-04-25 PROCEDURE — 3078F DIAST BP <80 MM HG: CPT | Mod: CPTII,S$GLB,, | Performed by: FAMILY MEDICINE

## 2024-04-25 PROCEDURE — 3075F SYST BP GE 130 - 139MM HG: CPT | Mod: CPTII,S$GLB,, | Performed by: FAMILY MEDICINE

## 2024-04-25 PROCEDURE — 1159F MED LIST DOCD IN RCRD: CPT | Mod: CPTII,S$GLB,, | Performed by: FAMILY MEDICINE

## 2024-04-25 PROCEDURE — 99214 OFFICE O/P EST MOD 30 MIN: CPT | Mod: S$GLB,,, | Performed by: FAMILY MEDICINE

## 2024-04-25 PROCEDURE — 3044F HG A1C LEVEL LT 7.0%: CPT | Mod: CPTII,S$GLB,, | Performed by: FAMILY MEDICINE

## 2024-04-25 PROCEDURE — 99999 PR PBB SHADOW E&M-EST. PATIENT-LVL IV: CPT | Mod: PBBFAC,,, | Performed by: FAMILY MEDICINE

## 2024-04-25 RX ORDER — FLUTICASONE PROPIONATE AND SALMETEROL 100; 50 UG/1; UG/1
1 POWDER RESPIRATORY (INHALATION) 2 TIMES DAILY
Qty: 60 EACH | Refills: 2 | Status: SHIPPED | OUTPATIENT
Start: 2024-04-25

## 2024-04-25 RX ORDER — PRAVASTATIN SODIUM 20 MG/1
20 TABLET ORAL NIGHTLY
Qty: 90 TABLET | Refills: 3 | Status: SHIPPED | OUTPATIENT
Start: 2024-04-25 | End: 2025-04-25

## 2024-04-25 NOTE — PROGRESS NOTES
"Subjective:         Patient ID: Shai Olvera is a 58 y.o. male.    Chief Complaint: Follow-up    Patient Active Problem List   Diagnosis    Tobacco use disorder    Primary hypertension    Bilateral hearing loss    Abnormal stress test    Restrictive lung disease secondary to obesity      Follow-up      SHAI is a 58 y.o. male who presents today for persistent left anterior chest pressure and dyspnea with ambulation.   He reports not consistent with all exertion. Can walk 3 block with dog and have symptoms, but can walk longer in [place] without.     Reports he is worried about what it may be. Reports that he has had gas pains and pain from stomach and this is not the same.  Has run [place] before and knows what exercise physiology feels like.   Reports that he does have physical anxiety where he cannot take a deep breath, he gets through by taking slow big breaths, also feels like not the same as what he is describing.     Has cut down on smoking.     Review of Systems   All other systems reviewed and are negative.       Objective:     Vitals:    04/25/24 1108 04/25/24 1135   BP: (!) 142/72 132/78   BP Location: Left arm    Patient Position: Sitting    Pulse: 84    SpO2: 98%    Weight: 93.6 kg (206 lb 5.6 oz)    Height: 5' 8" (1.727 m)          Physical Exam  Vitals and nursing note reviewed.   Constitutional:       General: He is not in acute distress.     Appearance: Normal appearance. He is not ill-appearing, toxic-appearing or diaphoretic.   HENT:      Head: Normocephalic and atraumatic.   Eyes:      General: No scleral icterus.     Conjunctiva/sclera: Conjunctivae normal.   Cardiovascular:      Rate and Rhythm: Normal rate.      Heart sounds: Normal heart sounds. No murmur heard.  Pulmonary:      Effort: Pulmonary effort is normal. No respiratory distress.      Breath sounds: Normal breath sounds.   Skin:     Coloration: Skin is not pale.   Neurological:      Mental Status: He is alert. Mental " status is at baseline.   Psychiatric:         Attention and Perception: Attention and perception normal.         Mood and Affect: Mood and affect normal.         Speech: Speech normal.         Behavior: Behavior normal.         Cognition and Memory: Cognition and memory normal.         Judgment: Judgment normal.       Assessment:       1. Primary hypertension    2. Mixed hyperlipidemia    3. Dyspnea on exertion    4. Atypical chest pain    5. Restrictive lung disease secondary to obesity    6. Abnormal stress test    7. Tobacco use disorder    8. Colon cancer screening          Plan:   Recent relevant labs results reviewed with patient.         1. Primary hypertension  Not started BB, likely with amlodipine will control BP.     2. Mixed hyperlipidemia  -     pravastatin (PRAVACHOL) 20 MG tablet; Take 1 tablet (20 mg total) by mouth every evening. For cholesterol and heart protection  Dispense: 90 tablet; Refill: 3  Crestor caused him hand rash per patient. Will trial of pravastatin to see if more tolerable    3. Dyspnea on exertion  4. Atypical chest pain  5. Restrictive lung disease secondary to obesity  Overview:  Due to obesity or other neuromuscular, extraparenchymal cause. Normal DLCO. PFTs 02/2024  Cardiac work up with abn stress test with normal heart cath. Abnormal PFTs and unremarkable chest CT.   Continue to exercise regularly.   ?Anxiety.  Symptoms not necessarily with all levels of exertion without consistency with more levels of exertion.   Trial on Advair to see if any response noted.   Reports Albuterol triggered cough in past.     Reports he is still very symptomatic and concerned.   Referral to pulm to review PFTs.   Smoking cessation encouraged.     -     Ambulatory referral/consult to Pulmonology; Future; Expected date: 05/02/2024  -     fluticasone-salmeterol diskus inhaler 100-50 mcg; Inhale 1 puff into the lungs 2 (two) times daily. Controller  Dispense: 60 each; Refill: 2    6. Abnormal stress  test  Overview:  S/p normal heart cath    7. Tobacco use disorder  Continued harm reduction. Reports has decreased.     8. Colon cancer screening  -     Cologuard Screening (Multitarget Stool DNA); Future; Expected date: 04/25/2024    Patient's questions answered. Plan reviewed with patient at the end of visit. Relevant precautions to chief complaint and reasons to seek further medical care or to contact the office sooner reviewed with patient.     Follow up in about 3 months (around 7/25/2024) for Hypertension Follow-up.

## 2024-04-26 ENCOUNTER — TELEPHONE (OUTPATIENT)
Dept: FAMILY MEDICINE | Facility: CLINIC | Age: 59
End: 2024-04-26
Payer: COMMERCIAL

## 2024-06-06 ENCOUNTER — PATIENT OUTREACH (OUTPATIENT)
Dept: ADMINISTRATIVE | Facility: HOSPITAL | Age: 59
End: 2024-06-06
Payer: COMMERCIAL

## 2024-06-11 ENCOUNTER — OFFICE VISIT (OUTPATIENT)
Dept: PULMONOLOGY | Facility: CLINIC | Age: 59
End: 2024-06-11
Payer: COMMERCIAL

## 2024-06-11 VITALS
BODY MASS INDEX: 31.74 KG/M2 | SYSTOLIC BLOOD PRESSURE: 139 MMHG | OXYGEN SATURATION: 97 % | HEART RATE: 79 BPM | HEIGHT: 68 IN | WEIGHT: 209.44 LBS | DIASTOLIC BLOOD PRESSURE: 71 MMHG

## 2024-06-11 DIAGNOSIS — E66.9 RESTRICTIVE LUNG DISEASE SECONDARY TO OBESITY: ICD-10-CM

## 2024-06-11 DIAGNOSIS — R06.09 DYSPNEA ON EXERTION: ICD-10-CM

## 2024-06-11 DIAGNOSIS — J98.4 RESTRICTIVE LUNG DISEASE: Primary | ICD-10-CM

## 2024-06-11 DIAGNOSIS — F17.200 TOBACCO USE DISORDER: ICD-10-CM

## 2024-06-11 DIAGNOSIS — J98.4 RESTRICTIVE LUNG DISEASE SECONDARY TO OBESITY: ICD-10-CM

## 2024-06-11 DIAGNOSIS — I50.33 ACUTE ON CHRONIC DIASTOLIC CONGESTIVE HEART FAILURE: ICD-10-CM

## 2024-06-11 PROCEDURE — 3008F BODY MASS INDEX DOCD: CPT | Mod: CPTII,S$GLB,, | Performed by: INTERNAL MEDICINE

## 2024-06-11 PROCEDURE — 99205 OFFICE O/P NEW HI 60 MIN: CPT | Mod: S$GLB,,, | Performed by: INTERNAL MEDICINE

## 2024-06-11 PROCEDURE — 3078F DIAST BP <80 MM HG: CPT | Mod: CPTII,S$GLB,, | Performed by: INTERNAL MEDICINE

## 2024-06-11 PROCEDURE — 3075F SYST BP GE 130 - 139MM HG: CPT | Mod: CPTII,S$GLB,, | Performed by: INTERNAL MEDICINE

## 2024-06-11 PROCEDURE — 1159F MED LIST DOCD IN RCRD: CPT | Mod: CPTII,S$GLB,, | Performed by: INTERNAL MEDICINE

## 2024-06-11 PROCEDURE — 3044F HG A1C LEVEL LT 7.0%: CPT | Mod: CPTII,S$GLB,, | Performed by: INTERNAL MEDICINE

## 2024-06-11 PROCEDURE — 99999 PR PBB SHADOW E&M-EST. PATIENT-LVL III: CPT | Mod: PBBFAC,,, | Performed by: INTERNAL MEDICINE

## 2024-06-11 RX ORDER — FUROSEMIDE 20 MG/1
20 TABLET ORAL 2 TIMES DAILY
Qty: 60 TABLET | Refills: 11 | Status: SHIPPED | OUTPATIENT
Start: 2024-06-11 | End: 2024-06-11

## 2024-06-11 RX ORDER — FUROSEMIDE 20 MG/1
20 TABLET ORAL DAILY
Qty: 30 TABLET | Refills: 11 | Status: SHIPPED | OUTPATIENT
Start: 2024-06-11 | End: 2025-06-11

## 2024-06-11 RX ORDER — SPIRONOLACTONE 25 MG/1
25 TABLET ORAL DAILY
Qty: 30 TABLET | Refills: 11 | Status: SHIPPED | OUTPATIENT
Start: 2024-06-11 | End: 2025-06-11

## 2024-06-11 NOTE — PROGRESS NOTES
Subjective:       Patient ID: Shai Olvera is a 58 y.o. male.    Chief Complaint: No chief complaint on file.    57 yo male with h/o murmur x20 years who presents with progressive worsening CÁRDENAS x 1 year now getting symptomatic with minimal exercise. Associated with left sided chest pain. Smokes 1ppd down from 2ppd. Continues to work but symptoms are starting to affect him at work. He had stress echo done which was positive. Had LHC which showed diastolic CHF but was told everything was normal. He has continued to have progressive symptoms. Had LDCT with minimal changes. Had PFTs which showed ? Severe restriction ( VA and TLC discordant).    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 Does not smoke menthol or vape.   Review of Systems   All other systems reviewed and are negative.      No past medical history on file.     Family History   Problem Relation Name Age of Onset    Hypertension Brother        If not mentioned in HPI, Family history is reviewed and not contributory    Social History     Tobacco Use    Smoking status: Every Day     Current packs/day: 1.00     Average packs/day: 1 pack/day for 39.4 years (39.4 ttl pk-yrs)     Types: Cigarettes     Start date: 1/1/1985    Smokeless tobacco: Never   Substance Use Topics    Alcohol use: Not Currently     Comment: Quit since 1996    Drug use: Never        Objective:        Vitals:    06/11/24 0909   BP: 139/71   Pulse: 79     Wt Readings from Last 3 Encounters:   06/11/24 95 kg (209 lb 7 oz)   04/25/24 93.6 kg (206 lb 5.6 oz)   02/23/24 91.2 kg (201 lb)     Temp Readings from Last 3 Encounters:   02/23/24 97.6 °F (36.4 °C) (Tympanic)   11/25/23 97.9 °F (36.6 °C) (Oral)     BP Readings from Last 3 Encounters:   06/11/24 139/71   04/25/24 132/78   02/23/24 123/64     Pulse Readings from Last 3 Encounters:   06/11/24 79   04/25/24 84   02/23/24 80       Physical Exam   Constitutional: He is oriented to person, place, and time. He appears  well-developed and well-nourished.   HENT:   Head: Normocephalic.   Mouth/Throat: Oropharynx is clear and moist.   Cardiovascular: Normal rate and regular rhythm.   Murmur (4/6 systolic louder with valsalva) heard.  Pulmonary/Chest: Normal expansion, symmetric chest wall expansion, effort normal and breath sounds normal. He has no wheezes.   Abdominal: Soft. He exhibits no distension.   Musculoskeletal:         General: No edema. Normal range of motion.   Lymphadenopathy: No supraclavicular adenopathy is present.     He has no cervical adenopathy.   Neurological: He is alert and oriented to person, place, and time. Gait normal.   Skin: Skin is warm and dry. No rash noted.   Psychiatric: He has a normal mood and affect. His behavior is normal. Thought content normal.   Vitals reviewed.    CBC  Lab Results   Component Value Date    WBC 11.33 02/22/2024    HGB 15.0 02/22/2024    HCT 43.9 02/22/2024    MCV 90 02/22/2024     02/22/2024         CMP  Sodium   Date Value Ref Range Status   02/22/2024 140 136 - 145 mmol/L Final     Potassium   Date Value Ref Range Status   02/22/2024 4.3 3.5 - 5.1 mmol/L Final     Chloride   Date Value Ref Range Status   02/22/2024 108 95 - 110 mmol/L Final     CO2   Date Value Ref Range Status   02/22/2024 24 23 - 29 mmol/L Final     Glucose   Date Value Ref Range Status   02/22/2024 96 70 - 110 mg/dL Final     BUN   Date Value Ref Range Status   02/22/2024 13 6 - 20 mg/dL Final     Creatinine   Date Value Ref Range Status   02/22/2024 0.9 0.5 - 1.4 mg/dL Final     Calcium   Date Value Ref Range Status   02/22/2024 9.2 8.7 - 10.5 mg/dL Final     Total Protein   Date Value Ref Range Status   01/10/2024 7.4 6.0 - 8.4 g/dL Final     Albumin   Date Value Ref Range Status   01/10/2024 4.0 3.5 - 5.2 g/dL Final   01/10/2024 4.0 3.5 - 5.2 g/dL Final     Total Bilirubin   Date Value Ref Range Status   01/10/2024 0.5 0.1 - 1.0 mg/dL Final     Comment:     For infants and newborns,  "interpretation of results should be based  on gestational age, weight and in agreement with clinical  observations.    Premature Infant recommended reference ranges:  Up to 24 hours.............<8.0 mg/dL  Up to 48 hours............<12.0 mg/dL  3-5 days..................<15.0 mg/dL  6-29 days.................<15.0 mg/dL       Alkaline Phosphatase   Date Value Ref Range Status   01/10/2024 83 55 - 135 U/L Final     AST   Date Value Ref Range Status   01/10/2024 18 10 - 40 U/L Final     ALT   Date Value Ref Range Status   01/10/2024 29 10 - 44 U/L Final     Anion Gap   Date Value Ref Range Status   02/22/2024 8 8 - 16 mmol/L Final     eGFR   Date Value Ref Range Status   02/22/2024 >60 >60 mL/min/1.73 m^2 Final       ABG  No results found for: "PH", "PO2", "PCO2"        Personal Diagnostic Review  I have personally reviewed the following data and added my own interpretation as below:  Stress Echo with preserved EF, ischemic changes, no assessment of valves  C wtihout obstructive CAD, LVEDP 33.  Cr normal  PFTs with ? Severe restriction but likely testing error. No obstruction  CT Chest 2/5/24 images personally reviewed and shows mild emphysema, mild air trapping; no ILD, no nodules  Cardiology notes reviewed  PCP notes reviewed      6/11/2024     9:09 AM 4/25/2024    11:08 AM 2/23/2024     2:30 PM 2/23/2024     2:15 PM 2/23/2024     2:00 PM 2/23/2024     1:30 PM 2/23/2024     1:15 PM   Pulmonary Function Tests   SpO2 97 % 98 % 98 % 97 % 97 % 97 % 98 %   Height 5' 8" (1.727 m) 5' 8" (1.727 m)        Weight 95 kg (209 lb 7 oz) 93.6 kg (206 lb 5.6 oz)        BMI (Calculated) 31.9 31.4              Assessment:       1. Restrictive lung disease    2. Dyspnea on exertion    3. Acute on chronic diastolic congestive heart failure    4. Restrictive lung disease secondary to obesity    5. Tobacco use disorder        Outpatient Encounter Medications as of 6/11/2024   Medication Sig Dispense Refill    amLODIPine (NORVASC) 5 MG " tablet Take 1 tablet (5 mg total) by mouth once daily. 90 tablet 3    carvediloL (COREG) 3.125 MG tablet Take 1 tablet (3.125 mg total) by mouth 2 (two) times daily. 180 tablet 3    fluticasone-salmeterol diskus inhaler 100-50 mcg Inhale 1 puff into the lungs 2 (two) times daily. Controller 60 each 2    neomycin-polymyxin-dexamethasone (MAXITROL) 3.5mg/mL-10,000 unit/mL-0.1 % DrpS Place 1 drop into the right eye 4 (four) times daily.      pravastatin (PRAVACHOL) 20 MG tablet Take 1 tablet (20 mg total) by mouth every evening. For cholesterol and heart protection 90 tablet 3    furosemide (LASIX) 20 MG tablet Take 1 tablet (20 mg total) by mouth once daily. 30 tablet 11    spironolactone (ALDACTONE) 25 MG tablet Take 1 tablet (25 mg total) by mouth once daily. 30 tablet 11    [DISCONTINUED] furosemide (LASIX) 20 MG tablet Take 1 tablet (20 mg total) by mouth 2 (two) times daily. 60 tablet 11     No facility-administered encounter medications on file as of 6/11/2024.     1. Dyspnea on exertion  - Ambulatory referral/consult to Pulmonology  - Echo; Future  - Ambulatory referral/consult to Cardiology; Future    2. Restrictive lung disease  - Complete PFT with bronchodilator; Future    3. Acute on chronic diastolic congestive heart failure  - Echo; Future  - Ambulatory referral/consult to Cardiology; Future    4. Restrictive lung disease secondary to obesity    5. Tobacco use disorder    Plan:     Problem List Items Addressed This Visit          Pulmonary    Restrictive lung disease secondary to obesity    Overview     Due to obesity or other neuromuscular, extraparenchymal cause. Normal DLCO. PFTs 02/2024         Current Assessment & Plan     Strongly suspect testing error given the discordance between TLC and VA but need to repeat testing to ensure not a progressive process. Repeat PFTs 1 month on diuretics            Cardiac/Vascular    Acute on chronic diastolic congestive heart failure    Current Assessment &  Plan     LVEDP 33 on [facility] and was given IV lasix post procedure.  Also with systolic murmur on exam 4/6  -start lasix and aldactone  -refer to cardiology at [place] to re-establish with cardiology and consider Farxiga  -Echo to better evaluate his murmur         Relevant Orders    Echo    Ambulatory referral/consult to Cardiology       Other    Tobacco use disorder    Current Assessment & Plan     Encourage cessation and discussed barriers.   LDCT next in Feb 2025.          Other Visit Diagnoses       Restrictive lung disease    -  Primary    Relevant Orders    Complete PFT with bronchodilator    Dyspnea on exertion        Relevant Orders    Echo    Ambulatory referral/consult to Cardiology          Untreated CHF poses current threat to bodily functions.    No follow-ups on file.    Future Appointments   Date Time Provider Department Center   6/14/2024  8:00 AM CV [facility] CARDIA [place]   6/26/2024  2:00 PM Ronald Dillon Jr., MD [facility] CARDIO [place]   10/16/2024  7:30 AM LAB, [facility]   10/18/2024  9:40 AM Shirin Brock MD [facility]           Jhon Nava MD

## 2024-06-11 NOTE — ASSESSMENT & PLAN NOTE
LVEDP 33 on C and was given IV lasix post procedure.  Also with systolic murmur on exam 4/6  -start lasix and aldactone  -refer to cardiology at [place] to re-establish with cardiology and consider Farxiga  -Echo to better evaluate his murmur

## 2024-06-11 NOTE — ASSESSMENT & PLAN NOTE
Strongly suspect testing error given the discordance between TLC and VA but need to repeat testing to ensure not a progressive process. Repeat PFTs 1 month on diuretics

## 2024-06-14 ENCOUNTER — HOSPITAL ENCOUNTER (OUTPATIENT)
Dept: CARDIOLOGY | Facility: HOSPITAL | Age: 59
Discharge: HOME | End: 2024-06-14
Attending: INTERNAL MEDICINE
Payer: COMMERCIAL

## 2024-06-14 VITALS
DIASTOLIC BLOOD PRESSURE: 71 MMHG | WEIGHT: 206 LBS | SYSTOLIC BLOOD PRESSURE: 123 MMHG | HEIGHT: 68 IN | BODY MASS INDEX: 31.22 KG/M2 | HEART RATE: 73 BPM

## 2024-06-14 DIAGNOSIS — I50.33 ACUTE ON CHRONIC DIASTOLIC CONGESTIVE HEART FAILURE: ICD-10-CM

## 2024-06-14 DIAGNOSIS — R06.09 DYSPNEA ON EXERTION: ICD-10-CM

## 2024-06-14 LAB
ASCENDING AORTA: 2.87 CM
AV INDEX (PROSTH): 0.67
AV MEAN GRADIENT: 10 MMHG
AV PEAK GRADIENT: 18 MMHG
AV VALVE AREA BY VELOCITY RATIO: 2.19 CM²
AV VALVE AREA: 2.09 CM²
AV VELOCITY RATIO: 0.7
BSA FOR ECHO PROCEDURE: 2.12 M2
CV ECHO LV RWT: 0.68 CM
DOP CALC AO PEAK VEL: 2.12 M/S
DOP CALC AO VTI: 46.4 CM
DOP CALC LVOT AREA: 3.1 CM2
DOP CALC LVOT DIAMETER: 2 CM
DOP CALC LVOT PEAK VEL: 1.48 M/S
DOP CALC LVOT STROKE VOLUME: 97.03 CM3
DOP CALCLVOT PEAK VEL VTI: 30.9 CM
E WAVE DECELERATION TIME: 186.35 MSEC
E/A RATIO: 0.87
E/E' RATIO: 13.4 M/S
ECHO LV POSTERIOR WALL: 1.5 CM (ref 0.6–1.1)
FRACTIONAL SHORTENING: 46 % (ref 28–44)
INTERVENTRICULAR SEPTUM: 1.5 CM (ref 0.6–1.1)
IVRT: 138.92 MSEC
LA MAJOR: 5.91 CM
LA MINOR: 6.03 CM
LEFT ATRIUM SIZE: 4.61 CM
LEFT ATRIUM VOLUME INDEX MOD: 44.4 ML/M2
LEFT ATRIUM VOLUME MOD: 91.9 CM3
LEFT INTERNAL DIMENSION IN SYSTOLE: 2.38 CM (ref 2.1–4)
LEFT VENTRICLE DIASTOLIC VOLUME INDEX: 42.36 ML/M2
LEFT VENTRICLE DIASTOLIC VOLUME: 87.68 ML
LEFT VENTRICLE MASS INDEX: 129 G/M2
LEFT VENTRICLE SYSTOLIC VOLUME INDEX: 9.6 ML/M2
LEFT VENTRICLE SYSTOLIC VOLUME: 19.84 ML
LEFT VENTRICULAR INTERNAL DIMENSION IN DIASTOLE: 4.4 CM (ref 3.5–6)
LEFT VENTRICULAR MASS: 266.87 G
LV LATERAL E/E' RATIO: 11.17 M/S
LV SEPTAL E/E' RATIO: 16.75 M/S
LVOT MG: 5.6 MMHG
LVOT MV: 1.13 CM/S
MV PEAK A VEL: 0.77 M/S
MV PEAK E VEL: 0.67 M/S
OHS CV RV/LV RATIO: 0.68 CM
PISA TR MAX VEL: 2.62 M/S
RA MAJOR: 4.45 CM
RA PRESSURE ESTIMATED: 3 MMHG
RIGHT VENTRICULAR END-DIASTOLIC DIMENSION: 3.01 CM
RV TB RVSP: 6 MMHG
RV TISSUE DOPPLER FREE WALL SYSTOLIC VELOCITY 1 (APICAL 4 CHAMBER VIEW): 21 CM/S
STJ: 2.38 CM
TDI LATERAL: 0.06 M/S
TDI SEPTAL: 0.04 M/S
TDI: 0.05 M/S
TR MAX PG: 27 MMHG
TV REST PULMONARY ARTERY PRESSURE: 30 MMHG
Z-SCORE OF LEFT VENTRICULAR DIMENSION IN END DIASTOLE: -3.58
Z-SCORE OF LEFT VENTRICULAR DIMENSION IN END SYSTOLE: -3.8

## 2024-06-14 PROCEDURE — 93306 TTE W/DOPPLER COMPLETE: CPT

## 2024-06-14 PROCEDURE — 93306 TTE W/DOPPLER COMPLETE: CPT | Mod: 26,,, | Performed by: INTERNAL MEDICINE

## 2024-06-26 ENCOUNTER — OFFICE VISIT (OUTPATIENT)
Dept: CARDIOLOGY | Facility: CLINIC | Age: 59
End: 2024-06-26
Payer: COMMERCIAL

## 2024-06-26 VITALS
DIASTOLIC BLOOD PRESSURE: 69 MMHG | SYSTOLIC BLOOD PRESSURE: 107 MMHG | HEART RATE: 93 BPM | BODY MASS INDEX: 31.48 KG/M2 | WEIGHT: 207 LBS

## 2024-06-26 DIAGNOSIS — I51.7 LEFT VENTRICULAR HYPERTROPHY: Primary | ICD-10-CM

## 2024-06-26 DIAGNOSIS — I50.32 CHRONIC DIASTOLIC CONGESTIVE HEART FAILURE: ICD-10-CM

## 2024-06-26 DIAGNOSIS — R06.09 DYSPNEA ON EXERTION: ICD-10-CM

## 2024-06-26 PROCEDURE — 3074F SYST BP LT 130 MM HG: CPT | Mod: CPTII,S$GLB,, | Performed by: INTERNAL MEDICINE

## 2024-06-26 PROCEDURE — 99214 OFFICE O/P EST MOD 30 MIN: CPT | Mod: S$GLB,,, | Performed by: INTERNAL MEDICINE

## 2024-06-26 PROCEDURE — 3044F HG A1C LEVEL LT 7.0%: CPT | Mod: CPTII,S$GLB,, | Performed by: INTERNAL MEDICINE

## 2024-06-26 PROCEDURE — 3078F DIAST BP <80 MM HG: CPT | Mod: CPTII,S$GLB,, | Performed by: INTERNAL MEDICINE

## 2024-06-26 PROCEDURE — 99999 PR PBB SHADOW E&M-EST. PATIENT-LVL III: CPT | Mod: PBBFAC,,, | Performed by: INTERNAL MEDICINE

## 2024-06-26 PROCEDURE — 3008F BODY MASS INDEX DOCD: CPT | Mod: CPTII,S$GLB,, | Performed by: INTERNAL MEDICINE

## 2024-06-26 PROCEDURE — 1159F MED LIST DOCD IN RCRD: CPT | Mod: CPTII,S$GLB,, | Performed by: INTERNAL MEDICINE

## 2024-06-26 NOTE — PROGRESS NOTES
Subjective:   06/26/2024     Patient ID:  Shai Olvera is a 58 y.o. male who presents for evaulation of Congestive Heart Failure, Shortness of Breath (Referral by Dr. Nava), Heart Murmur, and Heart Problem      Patient seen by pulmonary for dyspnea on exertion.  Echocardiography shows left ventricular hypertrophy.  Here for evaluation.      He has recently undergone cardiac evaluation.  An EKG showed fairly marked lateral T-wave inversion.  He underwent cardiac catheterization, this showed normal coronary arteries.    He is continued to have dyspnea on exertion.  He is a smoker.  Continues to smoke.      Hypertension appears to be well controlled currently on amlodipine, carvedilol, spironolactone.      Hypercholesterolemia is treated with moderate dose statin therapy.            No past medical history on file.    Review of patient's allergies indicates:  No Known Allergies      Current Outpatient Medications:     amLODIPine (NORVASC) 5 MG tablet, Take 1 tablet (5 mg total) by mouth once daily., Disp: 90 tablet, Rfl: 3    carvediloL (COREG) 3.125 MG tablet, Take 1 tablet (3.125 mg total) by mouth 2 (two) times daily., Disp: 180 tablet, Rfl: 3    fluticasone-salmeterol diskus inhaler 100-50 mcg, Inhale 1 puff into the lungs 2 (two) times daily. Controller, Disp: 60 each, Rfl: 2    furosemide (LASIX) 20 MG tablet, Take 1 tablet (20 mg total) by mouth once daily., Disp: 30 tablet, Rfl: 11    neomycin-polymyxin-dexamethasone (MAXITROL) 3.5mg/mL-10,000 unit/mL-0.1 % DrpS, Place 1 drop into the right eye 4 (four) times daily., Disp: , Rfl:     pravastatin (PRAVACHOL) 20 MG tablet, Take 1 tablet (20 mg total) by mouth every evening. For cholesterol and heart protection, Disp: 90 tablet, Rfl: 3    spironolactone (ALDACTONE) 25 MG tablet, Take 1 tablet (25 mg total) by mouth once daily., Disp: 30 tablet, Rfl: 11     Objective:   Review of Systems   Cardiovascular:  Positive for dyspnea on exertion. Negative for chest  pain, claudication, cyanosis, irregular heartbeat, leg swelling, near-syncope, orthopnea, palpitations, paroxysmal nocturnal dyspnea and syncope.         Vitals:    06/26/24 1405   BP: 107/69   Pulse: 93     Wt Readings from Last 3 Encounters:   06/26/24 93.9 kg (207 lb 0.2 oz)   06/14/24 93.4 kg (206 lb)   06/11/24 95 kg (209 lb 7 oz)     Temp Readings from Last 3 Encounters:   02/23/24 97.6 °F (36.4 °C) (Tympanic)   11/25/23 97.9 °F (36.6 °C) (Oral)     BP Readings from Last 3 Encounters:   06/26/24 107/69   06/14/24 123/71   06/11/24 139/71     Pulse Readings from Last 3 Encounters:   06/26/24 93   06/14/24 73   06/11/24 79             Physical Exam  Vitals reviewed.   Constitutional:       General: He is not in acute distress.     Appearance: He is well-developed.   HENT:      Head: Normocephalic and atraumatic.      Nose: Nose normal.   Eyes:      Conjunctiva/sclera: Conjunctivae normal.      Pupils: Pupils are equal, round, and reactive to light.   Neck:      Vascular: No JVD.   Cardiovascular:      Rate and Rhythm: Normal rate and regular rhythm.      Pulses: Intact distal pulses.      Heart sounds: Murmur (Grade 2-3 systolic ejection murmur) heard.      No friction rub. No gallop.   Pulmonary:      Effort: Pulmonary effort is normal. No respiratory distress.      Breath sounds: Normal breath sounds. No wheezing or rales.   Chest:      Chest wall: No tenderness.   Abdominal:      General: Bowel sounds are normal. There is no distension.      Palpations: Abdomen is soft.      Tenderness: There is no abdominal tenderness.   Musculoskeletal:         General: No tenderness or deformity. Normal range of motion.      Cervical back: Normal range of motion and neck supple.      Right lower leg: No edema.      Left lower leg: No edema.   Skin:     General: Skin is warm and dry.      Findings: No erythema or rash.   Neurological:      Mental Status: He is alert and oriented to person, place, and time.      Cranial  Nerves: No cranial nerve deficit.      Motor: No abnormal muscle tone.      Coordination: Coordination normal.   Psychiatric:         Behavior: Behavior normal.         Thought Content: Thought content normal.         Judgment: Judgment normal.           Lab Results   Component Value Date    CHOL 172 01/10/2024     Lab Results   Component Value Date    HDL 32 (L) 01/10/2024     Lab Results   Component Value Date    LDLCALC 108.0 01/10/2024     Lab Results   Component Value Date    ALT 29 01/10/2024    AST 18 01/10/2024     Lab Results   Component Value Date    CREATININE 0.9 02/22/2024    BUN 13 02/22/2024     02/22/2024    K 4.3 02/22/2024    CO2 24 02/22/2024    CO2 27 01/10/2024     Lab Results   Component Value Date    HGB 15.0 02/22/2024    HCT 43.9 02/22/2024    HCT 46.4 01/10/2024                         Assessment and Plan:     Dyspnea on exertion  -     Ambulatory referral/consult to Cardiology    Acute on chronic diastolic congestive heart failure  -     Ambulatory referral/consult to Cardiology     Echocardiography does show hypertrophy.  He has fairly hyperdynamic systolic function there is a outflow tract gradient with late peak consistent with hypertrophic cardiomyopathy also.  Will begin with a cardiac MRI to assess etiology of hypertrophic myopathy.  Depending upon results, he will likely need referral for hypertrophic Clinic.      No follow-ups on file.          Future Appointments   Date Time Provider Department Center   7/10/2024  2:00 PM PULMONARY LAB [facility] [place]   7/10/2024  3:20 PM Jhon Nava MD [facility] PULMON [place]   10/16/2024  7:30 AM LAB, [facility]   10/18/2024  9:40 AM Shirin Brock MD [facility]

## 2024-07-01 ENCOUNTER — TELEPHONE (OUTPATIENT)
Dept: CARDIOLOGY | Facility: CLINIC | Age: 59
End: 2024-07-01
Payer: COMMERCIAL

## 2024-07-01 NOTE — TELEPHONE ENCOUNTER
----- Message from Natalie Bruner sent at 7/1/2024 10:07 AM CDT -----  Regarding: MRI  Pt is calling to schedule his MRI and can be reached at [phone].    Thank you

## 2024-07-10 ENCOUNTER — OFFICE VISIT (OUTPATIENT)
Dept: PULMONOLOGY | Facility: CLINIC | Age: 59
End: 2024-07-10
Payer: COMMERCIAL

## 2024-07-10 ENCOUNTER — TELEPHONE (OUTPATIENT)
Dept: CARDIOLOGY | Facility: CLINIC | Age: 59
End: 2024-07-10
Payer: COMMERCIAL

## 2024-07-10 ENCOUNTER — HOSPITAL ENCOUNTER (OUTPATIENT)
Dept: PULMONOLOGY | Facility: HOSPITAL | Age: 59
Discharge: HOME | End: 2024-07-10
Attending: INTERNAL MEDICINE
Payer: COMMERCIAL

## 2024-07-10 VITALS
OXYGEN SATURATION: 97 % | DIASTOLIC BLOOD PRESSURE: 72 MMHG | BODY MASS INDEX: 31.57 KG/M2 | WEIGHT: 208.31 LBS | SYSTOLIC BLOOD PRESSURE: 129 MMHG | HEIGHT: 68 IN | HEART RATE: 73 BPM

## 2024-07-10 DIAGNOSIS — Z87.891 FORMER CIGARETTE SMOKER: Primary | ICD-10-CM

## 2024-07-10 DIAGNOSIS — J98.4 RESTRICTIVE LUNG DISEASE SECONDARY TO OBESITY: ICD-10-CM

## 2024-07-10 DIAGNOSIS — F17.200 TOBACCO USE DISORDER: ICD-10-CM

## 2024-07-10 DIAGNOSIS — I50.33 ACUTE ON CHRONIC DIASTOLIC CONGESTIVE HEART FAILURE: ICD-10-CM

## 2024-07-10 DIAGNOSIS — J98.4 RESTRICTIVE LUNG DISEASE: ICD-10-CM

## 2024-07-10 DIAGNOSIS — E66.9 RESTRICTIVE LUNG DISEASE SECONDARY TO OBESITY: ICD-10-CM

## 2024-07-10 LAB
DLCO SINGLE BREATH LLN: 20.94
DLCO SINGLE BREATH PRE REF: 79.7 %
DLCO SINGLE BREATH REF: 27.86
DLCOC SBVA LLN: 2.89
DLCOC SBVA REF: 4.15
DLCOC SINGLE BREATH LLN: 20.94
DLCOC SINGLE BREATH REF: 27.86
DLCOCSBVAULN: 5.4
DLCOCSINGLEBREATHULN: 34.79
DLCOSINGLEBREATHULN: 34.79
DLCOSINGLEBREATHZSCORE: -1.34
DLCOVA LLN: 2.89
DLCOVA PRE REF: 96.4 %
DLCOVA PRE: 4 ML/(MIN*MMHG*L) (ref 2.89–5.4)
DLCOVA REF: 4.15
DLCOVAULN: 5.4
ERV LLN: -16448.83
ERV PRE REF: 40.2 %
ERV REF: 1.17
ERVULN: ABNORMAL
FEF 25 75 LLN: 1.85
FEF 25 75 PRE REF: 67.2 %
FEF 25 75 REF: 3.56
FET100 CHG: 14.2 %
FEV05 LLN: 1.52
FEV05 REF: 2.66
FEV1 CHG: 5.9 %
FEV1 FVC LLN: 66
FEV1 FVC PRE REF: 97.9 %
FEV1 FVC REF: 78
FEV1 LLN: 2.58
FEV1 PRE REF: 78.6 %
FEV1 REF: 3.4
FEV1FVCZSCORE: -0.24
FEV1ZSCORE: -1.45
FRCPLETH LLN: 2.49
FRCPLETH PREREF: 88.5 %
FRCPLETH REF: 3.47
FRCPLETHULN: 4.46
FVC CHG: 6.6 %
FVC LLN: 3.34
FVC PRE REF: 80.2 %
FVC REF: 4.37
FVCZSCORE: -1.39
IVC PRE: 3.71 L (ref 3.34–5.41)
IVC SINGLE BREATH LLN: 3.34
IVC SINGLE BREATH PRE REF: 84.9 %
IVC SINGLE BREATH REF: 4.37
IVCSINGLEBREATHULN: 5.41
LLN IC: -9999996.9
PEF LLN: 6.4
PEF PRE REF: 47.9 %
PEF REF: 8.91
PHYSICIAN COMMENT: ABNORMAL
POST FEF 25 75: 2.42 L/S (ref 1.85–5.27)
POST FET 100: 8.77 SEC
POST FEV1 FVC: 75.88 % (ref 66.09–88.37)
POST FEV1: 2.83 L (ref 2.58–4.18)
POST FEV5: 2.11 L (ref 1.52–3.79)
POST FVC: 3.73 L (ref 3.34–5.41)
POST PEF: 4.61 L/S (ref 6.4–11.41)
PRE DLCO: 22.21 ML/(MIN*MMHG) (ref 20.94–34.79)
PRE ERV: 0.47 L (ref -16448.83–16451.17)
PRE FEF 25 75: 2.39 L/S (ref 1.85–5.27)
PRE FET 100: 7.68 SEC
PRE FEV05 REF: 72.8 %
PRE FEV1 FVC: 76.4 % (ref 66.09–88.37)
PRE FEV1: 2.67 L (ref 2.58–4.18)
PRE FEV5: 1.93 L (ref 1.52–3.79)
PRE FRC PL: 3.07 L (ref 2.49–4.46)
PRE FVC: 3.5 L (ref 3.34–5.41)
PRE IC: 3.24 L (ref -9999996.9–#######.####)
PRE PEF: 4.27 L/S (ref 6.4–11.41)
PRE REF IC: 104.6 %
PRE RV: 2.61 L (ref 1.63–2.98)
PRE TLC: 6.31 L (ref 5.57–7.87)
RAW PRE REF: 111 %
RAW PRE: 3.4 CMH2O*S/L (ref 3.06–3.06)
RAW REF: 3.06
REF IC: 3.1
RV LLN: 1.63
RV PRE REF: 112.9 %
RV REF: 2.31
RVTLC LLN: 28
RVTLC PRE REF: 112.8 %
RVTLC PRE: 41.28 % (ref 27.6–45.56)
RVTLC REF: 37
RVTLCULN: 46
RVULN: 2.98
SGAW PRE REF: 88.9 %
SGAW PRE: 0.07 1/(CMH2O*S) (ref 0.08–0.08)
SGAW REF: 0.08
TLC LLN: 5.57
TLC PRE REF: 93.9 %
TLC REF: 6.72
TLC ULN: 7.87
TLCZSCORE: -0.58
ULN IC: ABNORMAL
VA PRE: 5.55 L (ref 6.57–6.57)
VA SINGLE BREATH LLN: 6.57
VA SINGLE BREATH PRE REF: 84.5 %
VA SINGLE BREATH REF: 6.57
VASINGLEBREATHULN: 6.57
VC LLN: 3.34
VC PRE REF: 84.9 %
VC PRE: 3.71 L (ref 3.34–5.41)
VC REF: 4.37
VC ULN: 5.41

## 2024-07-10 PROCEDURE — 3008F BODY MASS INDEX DOCD: CPT | Mod: CPTII,S$GLB,, | Performed by: INTERNAL MEDICINE

## 2024-07-10 PROCEDURE — 94729 DIFFUSING CAPACITY: CPT | Mod: 26,S$GLB,, | Performed by: INTERNAL MEDICINE

## 2024-07-10 PROCEDURE — 1159F MED LIST DOCD IN RCRD: CPT | Mod: CPTII,S$GLB,, | Performed by: INTERNAL MEDICINE

## 2024-07-10 PROCEDURE — 3078F DIAST BP <80 MM HG: CPT | Mod: CPTII,S$GLB,, | Performed by: INTERNAL MEDICINE

## 2024-07-10 PROCEDURE — 3074F SYST BP LT 130 MM HG: CPT | Mod: CPTII,S$GLB,, | Performed by: INTERNAL MEDICINE

## 2024-07-10 PROCEDURE — 3044F HG A1C LEVEL LT 7.0%: CPT | Mod: CPTII,S$GLB,, | Performed by: INTERNAL MEDICINE

## 2024-07-10 PROCEDURE — 99214 OFFICE O/P EST MOD 30 MIN: CPT | Mod: 25,S$GLB,, | Performed by: INTERNAL MEDICINE

## 2024-07-10 PROCEDURE — 94726 PLETHYSMOGRAPHY LUNG VOLUMES: CPT | Mod: 26,S$GLB,, | Performed by: INTERNAL MEDICINE

## 2024-07-10 PROCEDURE — 94060 EVALUATION OF WHEEZING: CPT | Mod: 26,S$GLB,, | Performed by: INTERNAL MEDICINE

## 2024-07-10 PROCEDURE — 99999 PR PBB SHADOW E&M-EST. PATIENT-LVL III: CPT | Mod: PBBFAC,,, | Performed by: INTERNAL MEDICINE

## 2024-07-10 RX ORDER — DIAZEPAM 5 MG/1
5 TABLET ORAL ONCE
Qty: 1 TABLET | Refills: 0 | Status: SHIPPED | OUTPATIENT
Start: 2024-07-10 | End: 2024-07-10

## 2024-07-10 NOTE — PROGRESS NOTES
Subjective:       Patient ID: Shai Olvera is a 58 y.o. male.  The patient's last visit with me was on 6/11/2024.     HPI  Review of Systems    Objective:      Vitals:    07/10/24 1508   BP: 129/72   Pulse: 73     Wt Readings from Last 3 Encounters:   07/10/24 94.5 kg (208 lb 5.4 oz)   06/26/24 93.9 kg (207 lb 0.2 oz)   06/14/24 93.4 kg (206 lb)     Temp Readings from Last 3 Encounters:   02/23/24 97.6 °F (36.4 °C) (Tympanic)   11/25/23 97.9 °F (36.6 °C) (Oral)     BP Readings from Last 3 Encounters:   07/10/24 129/72   06/26/24 107/69   06/14/24 123/71     Pulse Readings from Last 3 Encounters:   07/10/24 73   06/26/24 93   06/14/24 73       Physical Exam    CBC  Lab Results   Component Value Date    WBC 11.33 02/22/2024    HGB 15.0 02/22/2024    HCT 43.9 02/22/2024    MCV 90 02/22/2024     02/22/2024         CMP  Sodium   Date Value Ref Range Status   02/22/2024 140 136 - 145 mmol/L Final     Potassium   Date Value Ref Range Status   02/22/2024 4.3 3.5 - 5.1 mmol/L Final     Chloride   Date Value Ref Range Status   02/22/2024 108 95 - 110 mmol/L Final     CO2   Date Value Ref Range Status   02/22/2024 24 23 - 29 mmol/L Final     Glucose   Date Value Ref Range Status   02/22/2024 96 70 - 110 mg/dL Final     BUN   Date Value Ref Range Status   02/22/2024 13 6 - 20 mg/dL Final     Creatinine   Date Value Ref Range Status   02/22/2024 0.9 0.5 - 1.4 mg/dL Final     Calcium   Date Value Ref Range Status   02/22/2024 9.2 8.7 - 10.5 mg/dL Final     Total Protein   Date Value Ref Range Status   01/10/2024 7.4 6.0 - 8.4 g/dL Final     Albumin   Date Value Ref Range Status   01/10/2024 4.0 3.5 - 5.2 g/dL Final   01/10/2024 4.0 3.5 - 5.2 g/dL Final     Total Bilirubin   Date Value Ref Range Status   01/10/2024 0.5 0.1 - 1.0 mg/dL Final     Comment:     For infants and newborns, interpretation of results should be based  on gestational age, weight and in agreement with clinical  observations.    Premature Infant  "recommended reference ranges:  Up to 24 hours.............<8.0 mg/dL  Up to 48 hours............<12.0 mg/dL  3-5 days..................<15.0 mg/dL  6-29 days.................<15.0 mg/dL       Alkaline Phosphatase   Date Value Ref Range Status   01/10/2024 83 55 - 135 U/L Final     AST   Date Value Ref Range Status   01/10/2024 18 10 - 40 U/L Final     ALT   Date Value Ref Range Status   01/10/2024 29 10 - 44 U/L Final     Anion Gap   Date Value Ref Range Status   02/22/2024 8 8 - 16 mmol/L Final     eGFR   Date Value Ref Range Status   02/22/2024 >60 >60 mL/min/1.73 m^2 Final       ABG  No results found for: "PH", "PO2", "PCO2"        Personal Diagnostic Review  I have personally reviewed the following data and added my own interpretation as below:  Echo noted with ? HCM  Discussed with cardiology and cardiology note reviewed  PFTs now normal.      7/10/2024     3:08 PM 6/26/2024     2:05 PM 6/14/2024     8:39 AM 6/11/2024     9:09 AM 4/25/2024    11:08 AM 2/23/2024     2:30 PM 2/23/2024     2:15 PM   Pulmonary Function Tests   SpO2 97 %   97 % 98 % 98 % 97 %   Height 5' 8" (1.727 m)  5' 8" (1.727 m) 5' 8" (1.727 m) 5' 8" (1.727 m)     Weight 94.5 kg (208 lb 5.4 oz) 93.9 kg (207 lb 0.2 oz) 93.4 kg (206 lb) 95 kg (209 lb 7 oz) 93.6 kg (206 lb 5.6 oz)     BMI (Calculated) 31.7 31.5 31.3 31.9 31.4           Assessment:       1. Former cigarette smoker    2. Restrictive lung disease secondary to obesity    3. Acute on chronic diastolic congestive heart failure    4. Tobacco use disorder        Outpatient Encounter Medications as of 7/10/2024   Medication Sig Dispense Refill    amLODIPine (NORVASC) 5 MG tablet Take 1 tablet (5 mg total) by mouth once daily. 90 tablet 3    carvediloL (COREG) 3.125 MG tablet Take 1 tablet (3.125 mg total) by mouth 2 (two) times daily. 180 tablet 3    fluticasone-salmeterol diskus inhaler 100-50 mcg Inhale 1 puff into the lungs 2 (two) times daily. Controller 60 each 2    furosemide (LASIX) " 20 MG tablet Take 1 tablet (20 mg total) by mouth once daily. 30 tablet 11    neomycin-polymyxin-dexamethasone (MAXITROL) 3.5mg/mL-10,000 unit/mL-0.1 % DrpS Place 1 drop into the right eye 4 (four) times daily.      pravastatin (PRAVACHOL) 20 MG tablet Take 1 tablet (20 mg total) by mouth every evening. For cholesterol and heart protection 90 tablet 3    spironolactone (ALDACTONE) 25 MG tablet Take 1 tablet (25 mg total) by mouth once daily. 30 tablet 11    diazePAM (VALIUM) 5 MG tablet Take 1 tablet (5 mg total) by mouth once. Take 20 minutes prior to MRI for 1 dose 1 tablet 0     No facility-administered encounter medications on file as of 7/10/2024.     1. Former cigarette smoker  - CT Chest Lung Screening Low Dose; Future    2. Restrictive lung disease secondary to obesity    3. Acute on chronic diastolic congestive heart failure    4. Tobacco use disorder    Plan:     Problem List Items Addressed This Visit          Pulmonary    Restrictive lung disease secondary to obesity    Overview     Due to obesity or other neuromuscular, extraparenchymal cause. Normal DLCO. PFTs 02/2024         Current Assessment & Plan     Repeat PFTs are normal. Suspect testing error previously. Will not repeat unless changes concerning for ILD on screening LDCT            Cardiac/Vascular    Acute on chronic diastolic congestive heart failure    Current Assessment & Plan     Discussed with cardiology. Will try to get cardiac MRI moved up. Ongoing workup for infiltrative cardiomyopathy.            Other    Tobacco use disorder    Current Assessment & Plan     Encourage cessation and discussed barriers.   LDCT next in Feb 2025.          Other Visit Diagnoses       Former cigarette smoker    -  Primary    Relevant Orders    CT Chest Lung Screening Low Dose            Please note Overview Notes are historic documentation. Please review A/P for current updates.  No follow-ups on file.    Future Appointments   Date Time Provider Department  [place]   9/3/2024  8:00 AM [facility]   10/16/2024  7:30 AM LAB, [facility] LAB [place]   10/18/2024  9:40 AM Shirin Brock MD [facility]         Jhon Nava MD

## 2024-07-10 NOTE — ASSESSMENT & PLAN NOTE
Repeat PFTs are normal. Suspect testing error previously. Will not repeat unless changes concerning for ILD on screening LDCT

## 2024-07-10 NOTE — ASSESSMENT & PLAN NOTE
Discussed with cardiology. Will try to get cardiac MRI moved up. Ongoing workup for infiltrative cardiomyopathy.

## 2024-07-25 ENCOUNTER — TELEPHONE (OUTPATIENT)
Dept: CARDIOLOGY | Facility: CLINIC | Age: 59
End: 2024-07-25
Payer: COMMERCIAL

## 2024-07-25 NOTE — TELEPHONE ENCOUNTER
----- Message from Selma Vera sent at 7/25/2024  2:31 PM CDT -----  Type:  Patient Returning Call    Who Called:Edwin byrd daughter    Who Left Message for Patient:  Does the patient know what this is regarding?:mri   Would the patient rather a call back or a response via [facility]? Call   Best Call Back Number:[phone]  Additional Information: states they spoke with [facility] and they said they never received order. States it should be faxed to [phone] and would like confirmation at number listed that orders were sent

## 2024-07-25 NOTE — TELEPHONE ENCOUNTER
----- Message from Gauri Montoya sent at 7/25/2024 12:05 PM CDT -----  Regarding: Test  Patient daughter Ambreen calling to speak with staff regarding her dad appt. She mentioned that she getting different information about his appt. Please call back @ [phone]. Thank you Gauri

## 2024-08-07 ENCOUNTER — TELEPHONE (OUTPATIENT)
Dept: CARDIOLOGY | Facility: CLINIC | Age: 59
End: 2024-08-07
Payer: COMMERCIAL

## 2024-08-27 ENCOUNTER — TELEPHONE (OUTPATIENT)
Dept: CARDIOLOGY | Facility: HOSPITAL | Age: 59
End: 2024-08-27
Payer: COMMERCIAL

## 2024-08-27 NOTE — TELEPHONE ENCOUNTER
Calling regarding your upcoming Cardiac MRI scheduled for 09/03/2024 at 8:00. For return call I can be reached at [phone], M-F 7:30-4. Thank you!

## 2024-08-28 ENCOUNTER — TELEPHONE (OUTPATIENT)
Dept: CARDIOLOGY | Facility: HOSPITAL | Age: 59
End: 2024-08-28
Payer: COMMERCIAL

## 2024-08-28 NOTE — TELEPHONE ENCOUNTER
Calling regarding your upcoming Cardiac MRI scheduled for 09/03/2024 at 8:00. For return call I can be reached at [phone], M-F 7:30-4. Thank you!

## 2024-08-29 ENCOUNTER — TELEPHONE (OUTPATIENT)
Dept: CARDIOLOGY | Facility: HOSPITAL | Age: 59
End: 2024-08-29
Payer: COMMERCIAL

## 2024-08-29 NOTE — TELEPHONE ENCOUNTER
I had the pleasure of discussing your upcoming Cardiac MRI scheduled for 09/03/2024 @ 8:00 at [facility] at [address] [address] [address] from the Main [facility].     We discussed and ensured that you will arrive for the scheduled appointment and confirmed the absence of a pacemaker/defibrillator, the absence of a cerebral aneurysm or surgical clip, pump, nerve or brain stimulator, middle or inner ear prosthesis or other metal implant or being injured by a metal object (i.e. bullet, bb, shrapnel).    NO special preparation is required for this particular MRI. You can eat and take medications as you normally do. Dr. Nava did send a prescription for Diazepam (Valium) to take 20-30 minutes prior to your MRI. Please have someone transport you to and from the appointment as you CANNOT drive while using this medication.    The test should take about 40-50 minutes to complete. It is important to hold still for the exam or the pictures will be unreadable. If you are claustrophobic or have pain issues that may interfere with your ability to stay still, please discuss this with the ordering provider. You may or may not receive IV Gadolinium during the exam if this is indicated, so an IV will be placed. THIS IS NOT A DYE AND DOES NOT CONTAIN IODINE. Your heart rate, blood pressure, and oxygen saturation will be continuously monitored throughout the exam. Rescue medications will be on hand in the event of any issues.      Thank you again for your time today. I can be reached at [phone], M-F from 7:30-4.

## 2024-09-03 ENCOUNTER — HOSPITAL ENCOUNTER (OUTPATIENT)
Dept: RADIOLOGY | Facility: HOSPITAL | Age: 59
Discharge: HOME | End: 2024-09-03
Attending: INTERNAL MEDICINE
Payer: COMMERCIAL

## 2024-09-03 ENCOUNTER — TELEPHONE (OUTPATIENT)
Dept: TRANSPLANT | Facility: CLINIC | Age: 59
End: 2024-09-03
Payer: COMMERCIAL

## 2024-09-03 DIAGNOSIS — I50.32 CHRONIC DIASTOLIC CONGESTIVE HEART FAILURE: ICD-10-CM

## 2024-09-03 DIAGNOSIS — I25.9 ISCHEMIC HEART DISEASE: ICD-10-CM

## 2024-09-03 DIAGNOSIS — I50.9 CONGESTIVE HEART FAILURE, UNSPECIFIED HF CHRONICITY, UNSPECIFIED HEART FAILURE TYPE: Primary | ICD-10-CM

## 2024-09-03 DIAGNOSIS — I51.7 LEFT VENTRICULAR HYPERTROPHY: ICD-10-CM

## 2024-09-03 DIAGNOSIS — I42.1 IHSS (IDIOPATHIC HYPERTROPHIC SUBAORTIC STENOSIS): Primary | ICD-10-CM

## 2024-09-03 PROCEDURE — A9585 GADOBUTROL INJECTION: HCPCS | Performed by: INTERNAL MEDICINE

## 2024-09-03 PROCEDURE — 75561 CARDIAC MRI FOR MORPH W/DYE: CPT | Mod: 26,,, | Performed by: PEDIATRICS

## 2024-09-03 PROCEDURE — 75561 CARDIAC MRI FOR MORPH W/DYE: CPT | Mod: TC

## 2024-09-03 PROCEDURE — 25500020 PHARM REV CODE 255: Performed by: INTERNAL MEDICINE

## 2024-09-03 RX ORDER — GADOBUTROL 604.72 MG/ML
14 INJECTION INTRAVENOUS
Status: COMPLETED | OUTPATIENT
Start: 2024-09-03 | End: 2024-09-03

## 2024-09-03 RX ADMIN — GADOBUTROL 14 ML: 604.72 INJECTION INTRAVENOUS at 09:09

## 2024-09-03 NOTE — PROGRESS NOTES
Tried to call, no answer.    MRI scan does show evidence for what appears to be a hypertrophic cardiomyopathy, the heart muscle is thick.  Would like him to see this specialists for this to see if there would be any medications he could take to correct for this.    Referral sent.

## 2024-09-03 NOTE — TELEPHONE ENCOUNTER
pt presented to office for refills; requested Carvedilol and another medication that he did not know the name of; advised to call office wtih the other medication and office will refill; pt & spouse stated the understood

## 2024-09-04 RX ORDER — CARVEDILOL 3.12 MG/1
3.12 TABLET ORAL 2 TIMES DAILY
Qty: 180 TABLET | Refills: 3 | Status: SHIPPED | OUTPATIENT
Start: 2024-09-04 | End: 2025-09-04

## 2024-10-17 ENCOUNTER — OFFICE VISIT (OUTPATIENT)
Dept: TRANSPLANT | Facility: CLINIC | Age: 59
End: 2024-10-17
Attending: INTERNAL MEDICINE
Payer: COMMERCIAL

## 2024-10-17 ENCOUNTER — TELEPHONE (OUTPATIENT)
Dept: GENETICS | Facility: CLINIC | Age: 59
End: 2024-10-17
Payer: COMMERCIAL

## 2024-10-17 ENCOUNTER — LAB VISIT (OUTPATIENT)
Dept: LAB | Facility: HOSPITAL | Age: 59
End: 2024-10-17
Attending: INTERNAL MEDICINE
Payer: COMMERCIAL

## 2024-10-17 VITALS
DIASTOLIC BLOOD PRESSURE: 65 MMHG | HEIGHT: 69 IN | WEIGHT: 204.56 LBS | BODY MASS INDEX: 30.3 KG/M2 | SYSTOLIC BLOOD PRESSURE: 119 MMHG | HEART RATE: 83 BPM

## 2024-10-17 DIAGNOSIS — I42.1 IHSS (IDIOPATHIC HYPERTROPHIC SUBAORTIC STENOSIS): Primary | ICD-10-CM

## 2024-10-17 DIAGNOSIS — I42.2 HYPERTROPHIC CARDIOMYOPATHY: ICD-10-CM

## 2024-10-17 DIAGNOSIS — I50.9 CONGESTIVE HEART FAILURE, UNSPECIFIED HF CHRONICITY, UNSPECIFIED HEART FAILURE TYPE: ICD-10-CM

## 2024-10-17 PROBLEM — I50.33 ACUTE ON CHRONIC DIASTOLIC CONGESTIVE HEART FAILURE: Status: RESOLVED | Noted: 2024-06-11 | Resolved: 2024-10-17

## 2024-10-17 LAB
ALBUMIN SERPL BCP-MCNC: 4.3 G/DL (ref 3.5–5.2)
ALP SERPL-CCNC: 87 U/L (ref 40–150)
ALT SERPL W/O P-5'-P-CCNC: 38 U/L (ref 10–44)
ANION GAP SERPL CALC-SCNC: 8 MMOL/L (ref 8–16)
AST SERPL-CCNC: 24 U/L (ref 10–40)
BASOPHILS # BLD AUTO: 0.04 K/UL (ref 0–0.2)
BASOPHILS NFR BLD: 0.4 % (ref 0–1.9)
BILIRUB SERPL-MCNC: 0.6 MG/DL (ref 0.1–1)
BUN SERPL-MCNC: 13 MG/DL (ref 6–20)
CALCIUM SERPL-MCNC: 9.6 MG/DL (ref 8.7–10.5)
CHLORIDE SERPL-SCNC: 104 MMOL/L (ref 95–110)
CO2 SERPL-SCNC: 27 MMOL/L (ref 23–29)
CREAT SERPL-MCNC: 1 MG/DL (ref 0.5–1.4)
DIFFERENTIAL METHOD BLD: NORMAL
EOSINOPHIL # BLD AUTO: 0.2 K/UL (ref 0–0.5)
EOSINOPHIL NFR BLD: 1.3 % (ref 0–8)
ERYTHROCYTE [DISTWIDTH] IN BLOOD BY AUTOMATED COUNT: 12.8 % (ref 11.5–14.5)
EST. GFR  (NO RACE VARIABLE): >60 ML/MIN/1.73 M^2
GLUCOSE SERPL-MCNC: 93 MG/DL (ref 70–110)
HCT VFR BLD AUTO: 44 % (ref 40–54)
HGB BLD-MCNC: 15 G/DL (ref 14–18)
IMM GRANULOCYTES # BLD AUTO: 0.03 K/UL (ref 0–0.04)
IMM GRANULOCYTES NFR BLD AUTO: 0.3 % (ref 0–0.5)
LYMPHOCYTES # BLD AUTO: 3.3 K/UL (ref 1–4.8)
LYMPHOCYTES NFR BLD: 29.7 % (ref 18–48)
MAGNESIUM SERPL-MCNC: 2.2 MG/DL (ref 1.6–2.6)
MCH RBC QN AUTO: 30.4 PG (ref 27–31)
MCHC RBC AUTO-ENTMCNC: 34.1 G/DL (ref 32–36)
MCV RBC AUTO: 89 FL (ref 82–98)
MONOCYTES # BLD AUTO: 0.9 K/UL (ref 0.3–1)
MONOCYTES NFR BLD: 7.6 % (ref 4–15)
NEUTROPHILS # BLD AUTO: 6.8 K/UL (ref 1.8–7.7)
NEUTROPHILS NFR BLD: 60.7 % (ref 38–73)
NRBC BLD-RTO: 0 /100 WBC
PLATELET # BLD AUTO: 284 K/UL (ref 150–450)
PMV BLD AUTO: 10.5 FL (ref 9.2–12.9)
POTASSIUM SERPL-SCNC: 3.9 MMOL/L (ref 3.5–5.1)
PROT SERPL-MCNC: 7.5 G/DL (ref 6–8.4)
RBC # BLD AUTO: 4.93 M/UL (ref 4.6–6.2)
SODIUM SERPL-SCNC: 139 MMOL/L (ref 136–145)
TSH SERPL DL<=0.005 MIU/L-ACNC: 0.88 UIU/ML (ref 0.4–4)
WBC # BLD AUTO: 11.12 K/UL (ref 3.9–12.7)

## 2024-10-17 PROCEDURE — 84443 ASSAY THYROID STIM HORMONE: CPT | Performed by: INTERNAL MEDICINE

## 2024-10-17 PROCEDURE — 85025 COMPLETE CBC W/AUTO DIFF WBC: CPT | Performed by: INTERNAL MEDICINE

## 2024-10-17 PROCEDURE — 99999 PR PBB SHADOW E&M-EST. PATIENT-LVL V: CPT | Mod: PBBFAC,,, | Performed by: INTERNAL MEDICINE

## 2024-10-17 PROCEDURE — 80053 COMPREHEN METABOLIC PANEL: CPT | Performed by: INTERNAL MEDICINE

## 2024-10-17 PROCEDURE — 83880 ASSAY OF NATRIURETIC PEPTIDE: CPT | Performed by: INTERNAL MEDICINE

## 2024-10-17 PROCEDURE — 83735 ASSAY OF MAGNESIUM: CPT | Performed by: INTERNAL MEDICINE

## 2024-10-17 RX ORDER — FUROSEMIDE 20 MG/1
20 TABLET ORAL
Qty: 30 TABLET | Refills: 11 | Status: SHIPPED | OUTPATIENT
Start: 2024-10-17 | End: 2025-10-17

## 2024-10-17 RX ORDER — METOPROLOL SUCCINATE 100 MG/1
100 TABLET, EXTENDED RELEASE ORAL DAILY
Qty: 90 TABLET | Refills: 3 | Status: SHIPPED | OUTPATIENT
Start: 2024-10-17 | End: 2025-10-17

## 2024-10-17 NOTE — PROGRESS NOTES
[facility] Initial Evaluation.      Attending Physician: Oscar Irving MD.          HPI.   Shai Olvera is a 59 y.o. male from [place] who presents for evaulation of HCM.  He had symptoms of dyspnea and had echocardiogram. The finding of asymmetric hypertrophy and GARO rised the concern for HCM. He underwent cardiac MRI. It confirmed LVH, worse in the septum, with MWT 21 mm.      An EKG showed fairly marked lateral T-wave inversion.  He underwent cardiac catheterization, this showed normal coronary arteries.     Other co morbidities: He is a smoker.  Continues to smoke.  Hypertension.  Hypercholesterolemia is treated with moderate dose statin therapy.    Review of Systems   Constitutional:  Positive for fatigue. Negative for chills, diaphoresis and fever.   HENT:  Negative for nasal congestion, rhinorrhea and sore throat.    Eyes:  Negative for visual disturbance.   Respiratory:  Positive for shortness of breath.    Cardiovascular:  Negative for chest pain.   Gastrointestinal:  Negative for abdominal pain, diarrhea, nausea and vomiting.   Genitourinary:  Negative for difficulty urinating, dysuria and hematuria.   Integumentary:  Negative for rash.   Neurological:  Negative for seizures, syncope and light-headedness.   Psychiatric/Behavioral:  Negative for agitation and hallucinations.         No past medical history on file.     Past Surgical History:   Procedure Laterality Date    CLOSURE DEVICE  2/23/2024    Procedure: Placement of Closure Device;  Surgeon: Shai Alfred MD;  Location: [facility] CATH LAB/EP;  Service: Cardiology;;    CORONARY ANGIOGRAPHY N/A 2/23/2024    Procedure: ANGIOGRAM, CORONARY ARTERY;  Surgeon: Shai Alfred MD;  Location: [facility] CATH LAB/EP;  Service: Cardiology;  Laterality: N/A;    EYE SURGERY      LEFT HEART CATHETERIZATION Right 2/23/2024     "Procedure: Left heart cath;  Surgeon: Shai Alfred MD;  Location: [facility] CATH LAB/EP;  Service: Cardiology;  Laterality: Right;        Family History   Problem Relation Name Age of Onset    Hypertension Brother          Review of patient's allergies indicates:  No Known Allergies     Current Outpatient Medications   Medication Instructions    amLODIPine (NORVASC) 5 mg, Oral, Daily    carvediloL (COREG) 3.125 mg, Oral, 2 times daily    diazePAM (VALIUM) 5 mg, Oral, Once, Take 20 minutes prior to MRI    fluticasone-salmeterol diskus inhaler 100-50 mcg 1 puff, Inhalation, 2 times daily, Controller    furosemide (LASIX) 20 mg, Oral, Daily    neomycin-polymyxin-dexamethasone (MAXITROL) 3.5mg/mL-10,000 unit/mL-0.1 % DrpS 1 drop, Right Eye, 4 times daily    pravastatin (PRAVACHOL) 20 mg, Oral, Nightly, For cholesterol and heart protection    spironolactone (ALDACTONE) 25 mg, Oral, Daily        There were no vitals filed for this visit.     Wt Readings from Last 3 Encounters:   07/10/24 94.5 kg (208 lb 5.4 oz)   06/26/24 93.9 kg (207 lb 0.2 oz)   06/14/24 93.4 kg (206 lb)     Temp Readings from Last 3 Encounters:   02/23/24 97.6 °F (36.4 °C) (Tympanic)   11/25/23 97.9 °F (36.6 °C) (Oral)     BP Readings from Last 3 Encounters:   07/10/24 129/72   06/26/24 107/69   06/14/24 123/71     Pulse Readings from Last 3 Encounters:   07/10/24 73   06/26/24 93   06/14/24 73        There is no height or weight on file to calculate BMI. Estimated body surface area is 2.13 meters squared as calculated from the following:    Height as of 7/10/24: 5' 8" (1.727 m).    Weight as of 7/10/24: 94.5 kg (208 lb 5.4 oz).     Physical Exam  Constitutional:       Appearance: He is well-developed.   HENT:      Head: Normocephalic and atraumatic.      Right Ear: External ear normal.      Left Ear: External ear normal.   Eyes:      Conjunctiva/sclera: Conjunctivae normal.      Pupils: Pupils are equal, round, and reactive to light.   Neck:      " Vascular: No hepatojugular reflux or JVD.   Cardiovascular:      Rate and Rhythm: Normal rate and regular rhythm.      Pulses: Intact distal pulses.      Heart sounds: S1 normal and S2 normal. Murmur heard.      Mid to late systolic murmur is present with a grade of 4/6 at the upper right sternal border. The intensity increases with valsalva.      No friction rub. No gallop.   Pulmonary:      Effort: Pulmonary effort is normal.      Breath sounds: Normal breath sounds.   Abdominal:      General: Bowel sounds are normal. There is no distension.      Palpations: Abdomen is soft.      Tenderness: There is no abdominal tenderness. There is no guarding or rebound.   Musculoskeletal:      Cervical back: Normal range of motion and neck supple.      Right lower leg: No edema.      Left lower leg: No edema.   Neurological:      Mental Status: He is alert and oriented to person, place, and time.          Lab Results   Component Value Date     02/22/2024    K 4.3 02/22/2024     02/22/2024    CO2 24 02/22/2024    BUN 13 02/22/2024    CREATININE 0.9 02/22/2024    GLU 96 02/22/2024    HGBA1C 5.5 01/10/2024    AST 18 01/10/2024    ALT 29 01/10/2024    ALBUMIN 4.0 01/10/2024    ALBUMIN 4.0 01/10/2024    PROT 7.4 01/10/2024    BILITOT 0.5 01/10/2024    WBC 11.33 02/22/2024    HGB 15.0 02/22/2024    HCT 43.9 02/22/2024     02/22/2024    TSH 1.220 01/10/2024    CHOL 172 01/10/2024    HDL 32 (L) 01/10/2024    LDLCALC 108.0 01/10/2024    TRIG 160 (H) 01/10/2024           Results for orders placed during the hospital encounter of 06/14/24    Echo    Interpretation Summary    Left Ventricle: The left ventricle is normal in size. Normal wall thickness. There is severe concentric hypertrophy. There is hyperdynamic systolic function with a visually estimated ejection fraction of 75 - 80%.    Right Ventricle: Normal right ventricular cavity size. Wall thickness is normal. Systolic function is normal.    Aortic Valve: The  "aortic valve is a trileaflet valve.    Mitral Valve: There is no stenosis. There is mild regurgitation.    Pulmonic Valve: There is no stenosis.    Pulmonary Artery: The estimated pulmonary artery systolic pressure is 30 mmHg.    IVC/SVC: Normal venous pressure at 3 mmHg.    There is severe concentric left ventricular hypertrophy without significant outflow tract gradient.  Global strain appears to be decreased with apical sparing.  Would consider workup for infiltrative myopathy with cardiac MRI.        Results for orders placed during the hospital encounter of 02/23/24    Cardiac catheterization    Conclusion    The estimated blood loss was none.    POST CATH NOTE    HPI:    s/p catheterization secondary to: Positive stress test and cardiac chest pain    Cath Results:  Access: US guided RRA  LM: large, 0% stenosis, ALBERTINA 3- flow  LAD: moderate, 0% stenosis, ALBERTINA 3- flow  LCx:  moderate, 0% stenosis, ALBERTINA 3- flow  RCA: moderate, 0% stenosis, ALBERTINA 3- flow  LVEDP 33    Closure device: Vasc Band  Patient tolerated procedure well, no complications    Post Cath Exam:  /73 (BP Location: Left arm, Patient Position: Lying)   Pulse 74   Temp 97.6 °F (36.4 °C) (Tympanic)   Resp 16   Ht 5' 8" (1.727 m)   Wt 91.2 kg (201 lb)   SpO2 98%   BMI 30.56 kg/m²    Assessment:  No evidence of obstructive CAD  LVEDP 33 mmHg      Plan:  - Patient tolerated procedure well. No immediate complications  - Routine post-cath care  - Routine post cath protocol  - Maximize medical management  - Give a lasix 20 IVP x1         Assessment and Plan:  There are no diagnoses linked to this encounter.      Hypertrophic Cardiomyopathy, II - minor symptoms, stage C.    Complications:  Stage C diastolic heart failure: No  LVOT obstruction:  Suspected.  Peak pressure gradient: ?.  Supraventricular tachycardia: No holter.  Atrial fibrillation:   No holter.  Ventricular Tachycardia, SCD or ICD shocks: No    Risk Stratification for SCD.  -Personal " history of syncope:  No  -Family history of SCD:   No  -Maximal wall thickness (MWT) >/= 3 .0 cm:   No.  MWT 2.1 cm.  -LVEF < 50%:   No.  -NSVT on Holter monitoring:   No Holter.  -Apical Aneurysm with fibrosis:   No.  -Myocardial fibrosis on Cardiac MRI:   No    Percentage 0.    Recommendation for ICD:   No.    Surveillance tests:    -Last exercise echo (date): none  -Last Holter monitoring (date): none  -Last cardiac MRI (date): 9/3/2024    Plan:  -Stop taking amlodipine 5 mg daily.  - Stop carvedilol.  -Take furosemide ONLY if needed.  -Start metoprolol xl 100 mg daily.  -Have Holter monitor   -F/u in 2 months with exercise echo to look for dynamic LVOTO.  -Referral to Genetic counselor for testing     2. Tobacco abuse.  Referral to smoking cessation program was entered.

## 2024-10-17 NOTE — PATIENT INSTRUCTIONS
You have just the right amount of fluid on you.  Please adhere to a low sodium diet (no more than 1.5 grams of sodium in 24h).  3.   Follow fluid restriction of  1. no more than 2 liters in 24 hours..   4. Stop taking amlodipine 5 mg daily.  5. Stop carvedilol.  6. Take furosemide ONLY if needed.  7. Start metoprolol xl 100 mg daily.  8. Have Holter monitor   9. F/u in 2 months with exercise echo.  10. You have been referred to Genetic counselor for testing and to smoking cessation program.

## 2024-10-17 NOTE — TELEPHONE ENCOUNTER
LVM informing pt to call office callback number [phone] to schedule appt with GC Govind/MAGALIE .     ----- Message from Govind Doran sent at 10/17/2024  2:46 PM CDT -----  Regarding: RE: Genetic Testing for HOCM  Hi Dom,    Can this patient be contacted to schedule a GC only appointment with me?     Thank you!    Best,  Govind  ----- Message -----  From: Sonia Thomas RN  Sent: 10/17/2024   2:17 PM CDT  To: Govind Doran [facility]  Subject: Genetic Testing for HOCM                         Hi Dr. Sarah Faust would like this patient to have genetic testing for HOCM.      Thank you,    Sonia ANNA

## 2024-10-18 ENCOUNTER — OFFICE VISIT (OUTPATIENT)
Dept: FAMILY MEDICINE | Facility: CLINIC | Age: 59
End: 2024-10-18
Payer: COMMERCIAL

## 2024-10-18 VITALS
SYSTOLIC BLOOD PRESSURE: 118 MMHG | OXYGEN SATURATION: 98 % | WEIGHT: 209 LBS | HEART RATE: 78 BPM | DIASTOLIC BLOOD PRESSURE: 58 MMHG | BODY MASS INDEX: 30.96 KG/M2 | HEIGHT: 69 IN

## 2024-10-18 DIAGNOSIS — F41.1 GAD (GENERALIZED ANXIETY DISORDER): ICD-10-CM

## 2024-10-18 DIAGNOSIS — I42.2 HYPERTROPHIC CARDIOMYOPATHY: ICD-10-CM

## 2024-10-18 DIAGNOSIS — E66.9 RESTRICTIVE LUNG DISEASE SECONDARY TO OBESITY: ICD-10-CM

## 2024-10-18 DIAGNOSIS — E78.2 MIXED HYPERLIPIDEMIA: ICD-10-CM

## 2024-10-18 DIAGNOSIS — I10 PRIMARY HYPERTENSION: Primary | ICD-10-CM

## 2024-10-18 DIAGNOSIS — Z12.11 COLON CANCER SCREENING: ICD-10-CM

## 2024-10-18 DIAGNOSIS — J98.4 RESTRICTIVE LUNG DISEASE SECONDARY TO OBESITY: ICD-10-CM

## 2024-10-18 DIAGNOSIS — F17.200 TOBACCO USE DISORDER: ICD-10-CM

## 2024-10-18 DIAGNOSIS — Z91.09 ENVIRONMENTAL ALLERGIES: ICD-10-CM

## 2024-10-18 PROBLEM — E78.1 HYPERTRIGLYCERIDEMIA WITHOUT HYPERCHOLESTEROLEMIA: Status: ACTIVE | Noted: 2024-10-18

## 2024-10-18 LAB — NT-PROBNP SERPL IA-MCNC: 292 PG/ML

## 2024-10-18 PROCEDURE — 99999 PR PBB SHADOW E&M-EST. PATIENT-LVL IV: CPT | Mod: PBBFAC,,, | Performed by: FAMILY MEDICINE

## 2024-10-18 RX ORDER — CHLORPHENIRAMINE MALEATE 4 MG
4 TABLET ORAL NIGHTLY
Qty: 90 TABLET | Refills: 3 | Status: SHIPPED | OUTPATIENT
Start: 2024-10-18

## 2024-10-18 RX ORDER — FLUTICASONE PROPIONATE 50 MCG
2 SPRAY, SUSPENSION (ML) NASAL 2 TIMES DAILY
Qty: 15.8 ML | Refills: 3 | Status: SHIPPED | OUTPATIENT
Start: 2024-10-18

## 2024-10-18 RX ORDER — OLOPATADINE HYDROCHLORIDE 1 MG/ML
1 SOLUTION/ DROPS OPHTHALMIC 2 TIMES DAILY
Qty: 5 ML | Refills: 5 | Status: SHIPPED | OUTPATIENT
Start: 2024-10-18 | End: 2025-10-18

## 2024-10-18 RX ORDER — PRAVASTATIN SODIUM 20 MG/1
20 TABLET ORAL NIGHTLY
Qty: 90 TABLET | Refills: 3 | Status: SHIPPED | OUTPATIENT
Start: 2024-10-18 | End: 2025-10-18

## 2024-10-18 RX ORDER — ESCITALOPRAM OXALATE 5 MG/1
5 TABLET ORAL DAILY
Qty: 90 TABLET | Refills: 3 | Status: SHIPPED | OUTPATIENT
Start: 2024-10-18

## 2024-10-18 NOTE — PROGRESS NOTES
"Subjective:         Patient ID: Shai Olvera is a 59 y.o. male.    Chief Complaint: Hypertension    Patient Active Problem List   Diagnosis    Tobacco use disorder    Primary hypertension    Bilateral hearing loss    Abnormal stress test    Restrictive lung disease secondary to obesity    Hypertrophic cardiomyopathy    Hypertriglyceridemia without hypercholesterolemia    Environmental allergies      HPI    SHAI is a 59 y.o. male    History of Present Illness    CHIEF COMPLAINT:  Shai presents today for hypertension follow-up.    CARDIOVASCULAR:  He was recently diagnosed with LVOT by cardiology. Medication changes were made, including discontinuation of amlodipine at Cards visit yesterday. He currently takes metoprolol for hypertension and spironolactone prescribed by pulmonology. He reports feeling better after the cardiology visit. A stress test is planned, with a follow-up visit scheduled with cardiology in December. He denies recent exertional discomfort or pain.    ANXIETY:  He expresses significant anxiety and concern over his cardiac status, which has led to a reduction in his physical activity. He acknowledges that most of his limitations have been mental rather than physical. Lexapro 5 mg has been started for chronic anxiety, to be taken consistently daily, with the option to change to nighttime administration if it proves overly activating.    ALLERGIES:  He reports ongoing chronic allergy symptoms. He states that OTC antihistamines are sometimes ineffective in managing symptoms and can have either sedating or activating effects on him. He reports that he has received intermittent steroid injections in the past and continues to request.     Objective:     Vitals:    10/18/24 0954   BP: (!) 118/58   BP Location: Left arm   Patient Position: Sitting   Pulse: 78   SpO2: 98%   Weight: 94.8 kg (208 lb 15.9 oz)   Height: 5' 9" (1.753 m)         Physical Exam  Vitals and nursing note reviewed.   Constitutional: "       General: He is not in acute distress.     Appearance: Normal appearance. He is not ill-appearing, toxic-appearing or diaphoretic.   HENT:      Head: Normocephalic and atraumatic.   Eyes:      General: No scleral icterus.     Conjunctiva/sclera: Conjunctivae normal.   Cardiovascular:      Rate and Rhythm: Normal rate.   Pulmonary:      Effort: Pulmonary effort is normal. No respiratory distress.   Skin:     Coloration: Skin is not pale.   Neurological:      Mental Status: He is alert. Mental status is at baseline.   Psychiatric:         Attention and Perception: Attention and perception normal.         Mood and Affect: Mood and affect normal.         Speech: Speech normal.         Behavior: Behavior normal.         Cognition and Memory: Cognition and memory normal.         Judgment: Judgment normal.       Assessment:       1. Primary hypertension    2. Mixed hyperlipidemia    3. Hypertrophic cardiomyopathy    4. Restrictive lung disease secondary to obesity    5. Tobacco use disorder    6. Environmental allergies    7. Colon cancer screening    8. MYNOR (generalized anxiety disorder)          Plan:   Recent relevant labs results reviewed with patient.         Assessment & Plan    Assessed hypertension following recent cardiology evaluation and medication adjustments  Evaluated chronic allergy symptoms and considered antihistamine options  Addressed chronic tension and anxiety, particularly related to cardiac status  Reviewed physical activity level, noting reduction due to cardiac concerns    PHYSICAL ACTIVITY:  - Shai to continue building physical activity.    ALLERGIES:  - Started chlorpheniramine 4 mg to be taken at night for allergy symptoms.  - Started Flonase for daytime allergy symptom management.  - Started Pataday eye drops for daytime allergy symptom management.    CHRONIC ANXIETY:  - Started Lexapro 5 mg daily for chronic anxiety, with option to change to nighttime if overly activating.  - Follow up in  3 months to reassess chronic anxiety management.    HYPERTENSION:  - Continued metoprolol for hypertension at current dose.    FOLLOW UP:  - Follow up appointment scheduled for January with annika.         1. Primary hypertension  -     Hepatic Function Panel; Future; Expected date: 10/18/2024  -     Renal Function Panel; Future; Expected date: 10/18/2024  -     Lipid Panel; Future; Expected date: 10/18/2024  -     TSH; Future; Expected date: 10/18/2024  -     Hemoglobin A1C; Future; Expected date: 10/18/2024    2. Mixed hyperlipidemia  -     pravastatin (PRAVACHOL) 20 MG tablet; Take 1 tablet (20 mg total) by mouth every evening. For cholesterol and heart protection  Dispense: 90 tablet; Refill: 3  -     Lipid Panel; Future; Expected date: 10/18/2024    3. Hypertrophic cardiomyopathy    4. Restrictive lung disease secondary to obesity  Overview:  Due to obesity or other neuromuscular, extraparenchymal cause. Normal DLCO. PFTs 02/2024      5. Tobacco use disorder  -     Ambulatory referral/consult to Smoking Cessation Program; Future; Expected date: 10/25/2024    6. Environmental allergies  -     fluticasone propionate (FLONASE) 50 mcg/actuation nasal spray; 2 sprays (100 mcg total) by Each Nostril route 2 (two) times daily.  Dispense: 15.8 mL; Refill: 3  -     olopatadine (PATANOL) 0.1 % ophthalmic solution; Place 1 drop into both eyes 2 (two) times daily.  Dispense: 5 mL; Refill: 5  -     chlorpheniramine (CHLOR-TRIMETON) 4 mg tablet; Take 1 tablet (4 mg total) by mouth every evening.  Dispense: 90 tablet; Refill: 3    7. Colon cancer screening  -     Cologuard Screening (Multitarget Stool DNA); Future; Expected date: 10/18/2024    8. MYNOR (generalized anxiety disorder)  -     EScitalopram oxalate (LEXAPRO) 5 MG Tab; Take 1 tablet (5 mg total) by mouth once daily. For anxiety  Dispense: 90 tablet; Refill: 3      Patient's questions answered. Plan reviewed with patient at the end of visit. Relevant precautions to  chief complaint and reasons to seek further medical care or to contact the office sooner reviewed with patient.     Follow up in about 3 months (around 1/18/2025) for Annual Exam, f/u MYNOR (annika).        Part of this note was dictated using voice recognition software. Please excuse any typographical errors.     This note was generated with the assistance of ambient listening technology. Verbal consent was obtained by the patient and accompanying visitor(s) for the recording of patient appointment to facilitate this note. I attest to having reviewed and edited the generated note for accuracy, though some syntax or spelling errors may persist. Please contact the author of this note for any clarification.

## 2024-10-24 ENCOUNTER — CLINICAL SUPPORT (OUTPATIENT)
Dept: CARDIOLOGY | Facility: HOSPITAL | Age: 59
End: 2024-10-24
Attending: INTERNAL MEDICINE
Payer: COMMERCIAL

## 2024-10-24 DIAGNOSIS — I42.1 IHSS (IDIOPATHIC HYPERTROPHIC SUBAORTIC STENOSIS): ICD-10-CM

## 2024-10-24 PROCEDURE — 93225 XTRNL ECG REC<48 HRS REC: CPT

## 2024-10-24 PROCEDURE — 93226 XTRNL ECG REC<48 HR SCAN A/R: CPT

## 2024-11-13 ENCOUNTER — TELEPHONE (OUTPATIENT)
Dept: GENETICS | Facility: CLINIC | Age: 59
End: 2024-11-13
Payer: COMMERCIAL

## 2024-11-13 NOTE — TELEPHONE ENCOUNTER
TRISHAM informing pt to call office callback number [phone] to schedule appt with GC Vealmaka/HOCSALOMÓN .       ----- Message from Govind Doran sent at 11/13/2024  9:31 AM CST -----  Regarding: RE: HOCM genetic testing  Thanks Sonia!    Dom, can you try calling this patient again about scheduling a GC only visit with me?    Thanks!    Govind  ----- Message -----  From: Sonia Thomas RN  Sent: 11/13/2024   9:04 AM CST  To: Govind Doran [facility]  Subject: HOCM genetic testing                             Hi Govind,    Would you have this patient scheduled for genetic testing for HOCM?    Thank you!    Sonia

## 2024-11-18 ENCOUNTER — TELEPHONE (OUTPATIENT)
Dept: GENETICS | Facility: CLINIC | Age: 59
End: 2024-11-18
Payer: COMMERCIAL

## 2024-11-18 NOTE — TELEPHONE ENCOUNTER
TRISHAM informing pt to call office callback number [phone] to schedule appt with LEILANI Faust/MAGALIE.       ----- Message from Govind Doran sent at 11/18/2024 10:27 AM CST -----  Regarding: RE: Genetic testing - HOCM  Hi Rene,    Can you try reaching this patient again about scheduling with me?    Thank you!    Best,  Govind  ----- Message -----  From: Sonia Thomas RN  Sent: 11/18/2024   9:32 AM CST  To: Govind Doran [facility]  Subject: Genetic testing - HOCM                           Emiliano Faust,    Could you ask your team to schedule Mr. Olvera for genetic counseling and testing for HOCM?    Thank you,    Sonia

## 2024-12-02 ENCOUNTER — TELEPHONE (OUTPATIENT)
Dept: TRANSPLANT | Facility: CLINIC | Age: 59
End: 2024-12-02
Payer: COMMERCIAL

## 2024-12-02 NOTE — TELEPHONE ENCOUNTER
Mr. Olvera was contacted by phone to let him know that his appointment with Dr. Smith needed to be moved to after his Echo, which is scheduled for 12/23/24.  He was contacted by  that his appointment with Dr. Smith will be on 01/10/25.  The patient was unhappy about this change and proceeded to swear at the .  I was informed of this and called Mr. Olvera to explain the reason for the appointment change.  He proceeded to use profanity with me multiple times, even after I requested that he not.  I told him that I will put a copy of his upcoming appointments in the mail for his appointment reminders.

## 2024-12-23 ENCOUNTER — HOSPITAL ENCOUNTER (OUTPATIENT)
Dept: CARDIOLOGY | Facility: HOSPITAL | Age: 59
Discharge: HOME | End: 2024-12-23
Attending: INTERNAL MEDICINE
Payer: COMMERCIAL

## 2024-12-23 VITALS — WEIGHT: 208 LBS | BODY MASS INDEX: 31.52 KG/M2 | HEIGHT: 68 IN

## 2024-12-23 DIAGNOSIS — I42.1 IHSS (IDIOPATHIC HYPERTROPHIC SUBAORTIC STENOSIS): ICD-10-CM

## 2024-12-23 LAB
ASCENDING AORTA: 3.31 CM
AV MEAN GRADIENT: 7 MMHG
AV PEAK GRADIENT: 11.6 MMHG
BSA FOR ECHO PROCEDURE: 2.13 M2
CV ECHO LV RWT: 0.68 CM
CV STRESS BASE HR: 72 BPM
DIASTOLIC BLOOD PRESSURE: 71 MMHG
DOP CALC AO PEAK VEL: 1.7 M/S
DOP CALC AO VTI: 42.2 CM
DOP CALC LVOT AREA: 4.2 CM2
DOP CALC LVOT DIAMETER: 2.3 CM
E WAVE DECELERATION TIME: 203.07 MS
E/A RATIO: 1
E/E' RATIO: 15.56 M/S
ECHO EF ESTIMATED: 75 %
ECHO LV POSTERIOR WALL: 1.5 CM (ref 0.6–1.1)
EJECTION FRACTION: 70 %
FRACTIONAL SHORTENING: 43.2 % (ref 28–44)
INTERVENTRICULAR SEPTUM: 1.4 CM (ref 0.6–1.1)
IVC DIAMETER: 1.3 CM
LA MAJOR: 6.16 CM
LA MINOR: 5.67 CM
LA WIDTH: 5.36 CM
LEFT ATRIUM SIZE: 4.3 CM
LEFT ATRIUM VOLUME INDEX MOD: 56.6 ML/M2
LEFT ATRIUM VOLUME INDEX: 55.6 ML/M2
LEFT ATRIUM VOLUME MOD: 117.68 ML
LEFT ATRIUM VOLUME: 115.68 CM3
LEFT INTERNAL DIMENSION IN SYSTOLE: 2.5 CM (ref 2.1–4)
LEFT VENTRICLE DIASTOLIC VOLUME INDEX: 41.24 ML/M2
LEFT VENTRICLE DIASTOLIC VOLUME: 85.77 ML
LEFT VENTRICLE MASS INDEX: 121.8 G/M2
LEFT VENTRICLE SYSTOLIC VOLUME INDEX: 10.2 ML/M2
LEFT VENTRICLE SYSTOLIC VOLUME: 21.18 ML
LEFT VENTRICULAR INTERNAL DIMENSION IN DIASTOLE: 4.4 CM (ref 3.5–6)
LEFT VENTRICULAR MASS: 253.4 G
LEFT VENTRICULAR OUTFLOW TRACT PEAK GRADIENT REST: 19 MMHG
LEFT VENTRICULAR OUTFLOW TRACT PEAK GRADIENT VALSALVA: 37 MMHG
LV LATERAL E/E' RATIO: 11.67
LV SEPTAL E/E' RATIO: 23.33
MV PEAK A VEL: 0.7 M/S
MV PEAK E VEL: 0.7 M/S
OHS CV CPX 1 MINUTE RECOVERY HEART RATE: 102 BPM
OHS CV CPX 85 PERCENT MAX PREDICTED HEART RATE MALE: 137
OHS CV CPX ESTIMATED METS: 9
OHS CV CPX MAX PREDICTED HEART RATE: 161
OHS CV CPX PATIENT IS FEMALE: 0
OHS CV CPX PATIENT IS MALE: 1
OHS CV CPX PEAK DIASTOLIC BLOOD PRESSURE: 71 MMHG
OHS CV CPX PEAK HEAR RATE: 117 BPM
OHS CV CPX PEAK RATE PRESSURE PRODUCT: ABNORMAL
OHS CV CPX PEAK SYSTOLIC BLOOD PRESSURE: 160 MMHG
OHS CV CPX PERCENT MAX PREDICTED HEART RATE ACHIEVED: 73
OHS CV CPX RATE PRESSURE PRODUCT PRESENTING: 9936
OHS CV RV/LV RATIO: 0.73 CM
RA MAJOR: 4.59 CM
RA PRESSURE ESTIMATED: 3 MMHG
RA WIDTH: 3.34 CM
RIGHT ATRIAL AREA: 16 CM2
RIGHT ATRIUM VOLUME AREA LENGTH APICAL 4 CHAMBER: 46 ML
RIGHT VENTRICLE DIASTOLIC BASEL DIMENSION: 3.2 CM
RV TISSUE DOPPLER FREE WALL SYSTOLIC VELOCITY 1 (APICAL 4 CHAMBER VIEW): 12.77 CM/S
SINUS: 3.27 CM
STJ: 3.03 CM
STRESS ECHO POST EXERCISE DUR MIN: 6 MINUTES
STRESS ECHO POST EXERCISE DUR SEC: 8 SECONDS
STRESS ST DEPRESSION: 1 MM
SYSTOLIC BLOOD PRESSURE: 138 MMHG
TDI LATERAL: 0.06 M/S
TDI SEPTAL: 0.03 M/S
TDI: 0.05 M/S
TRICUSPID ANNULAR PLANE SYSTOLIC EXCURSION: 1.95 CM
Z-SCORE OF LEFT VENTRICULAR DIMENSION IN END DIASTOLE: -3.7
Z-SCORE OF LEFT VENTRICULAR DIMENSION IN END SYSTOLE: -3.51

## 2024-12-23 PROCEDURE — 93320 DOPPLER ECHO COMPLETE: CPT

## 2025-01-08 ENCOUNTER — TELEPHONE (OUTPATIENT)
Dept: TRANSPLANT | Facility: CLINIC | Age: 60
End: 2025-01-08
Payer: COMMERCIAL

## 2025-01-09 ENCOUNTER — OFFICE VISIT (OUTPATIENT)
Dept: PULMONOLOGY | Facility: CLINIC | Age: 60
End: 2025-01-09
Payer: COMMERCIAL

## 2025-01-09 ENCOUNTER — HOSPITAL ENCOUNTER (OUTPATIENT)
Dept: RADIOLOGY | Facility: HOSPITAL | Age: 60
Discharge: HOME | End: 2025-01-09
Attending: INTERNAL MEDICINE
Payer: COMMERCIAL

## 2025-01-09 VITALS
HEIGHT: 68 IN | BODY MASS INDEX: 32.68 KG/M2 | WEIGHT: 215.63 LBS | HEART RATE: 81 BPM | OXYGEN SATURATION: 97 % | DIASTOLIC BLOOD PRESSURE: 77 MMHG | SYSTOLIC BLOOD PRESSURE: 129 MMHG

## 2025-01-09 DIAGNOSIS — Z87.891 FORMER CIGARETTE SMOKER: Primary | ICD-10-CM

## 2025-01-09 DIAGNOSIS — E66.9 RESTRICTIVE LUNG DISEASE SECONDARY TO OBESITY: ICD-10-CM

## 2025-01-09 DIAGNOSIS — Z87.891 FORMER CIGARETTE SMOKER: ICD-10-CM

## 2025-01-09 DIAGNOSIS — J98.4 RESTRICTIVE LUNG DISEASE SECONDARY TO OBESITY: ICD-10-CM

## 2025-01-09 PROBLEM — F17.200 TOBACCO USE DISORDER: Status: RESOLVED | Noted: 2024-01-10 | Resolved: 2025-01-09

## 2025-01-09 PROCEDURE — 71271 CT THORAX LUNG CANCER SCR C-: CPT | Mod: TC

## 2025-01-09 PROCEDURE — 99999 PR PBB SHADOW E&M-EST. PATIENT-LVL III: CPT | Mod: PBBFAC,,, | Performed by: INTERNAL MEDICINE

## 2025-01-09 NOTE — ASSESSMENT & PLAN NOTE
Repeat PFTs are normal. Suspect testing error previously. Will not repeat unless changes concerning for ILD on screening LDCT.  -will not limit any interventions needed for his cardiac issues

## 2025-01-09 NOTE — PROGRESS NOTES
Subjective:       Patient ID: Shai Olvera is a 59 y.o. male.  The patient's last visit with me was on 7/10/2024.     Stopped smoking completely. Had beta blocker adjusted and now on PRN diuretics. Feels much better with his exercise tolerance. Has fu with cardiology for his HCM tomorrow.    Follow-up  Review of Systems    Objective:      Vitals:    01/09/25 1546   BP: 129/77   Pulse: 81     Wt Readings from Last 3 Encounters:   01/09/25 97.8 kg (215 lb 9.8 oz)   12/23/24 94.3 kg (208 lb)   10/18/24 94.8 kg (208 lb 15.9 oz)     Temp Readings from Last 3 Encounters:   02/23/24 97.6 °F (36.4 °C) (Tympanic)   11/25/23 97.9 °F (36.6 °C) (Oral)     BP Readings from Last 3 Encounters:   01/09/25 129/77   10/18/24 (!) 118/58   10/17/24 119/65     Pulse Readings from Last 3 Encounters:   01/09/25 81   10/18/24 78   10/17/24 83       Physical Exam   Constitutional: He is oriented to person, place, and time. He appears well-developed and well-nourished.   HENT:   Head: Normocephalic.   Mouth/Throat: Oropharynx is clear and moist.   Cardiovascular: Normal rate and regular rhythm.   Murmur heard.  Pulmonary/Chest: Normal expansion, symmetric chest wall expansion, effort normal and breath sounds normal. He has no wheezes.   Abdominal: Soft. He exhibits no distension.   Musculoskeletal:         General: No edema. Normal range of motion.   Lymphadenopathy: No supraclavicular adenopathy is present.     He has no cervical adenopathy.   Neurological: He is alert and oriented to person, place, and time. Gait normal.   Skin: Skin is warm and dry. No rash noted.   Psychiatric: He has a normal mood and affect. His behavior is normal. Thought content normal.   Vitals reviewed.    CBC  Lab Results   Component Value Date    WBC 11.12 10/17/2024    HGB 15.0 10/17/2024    HCT 44.0 10/17/2024    MCV 89 10/17/2024     10/17/2024         CMP  Sodium   Date Value Ref Range Status   10/17/2024 139 136 - 145 mmol/L Final     Potassium  "  Date Value Ref Range Status   10/17/2024 3.9 3.5 - 5.1 mmol/L Final     Chloride   Date Value Ref Range Status   10/17/2024 104 95 - 110 mmol/L Final     CO2   Date Value Ref Range Status   10/17/2024 27 23 - 29 mmol/L Final     Glucose   Date Value Ref Range Status   10/17/2024 93 70 - 110 mg/dL Final     BUN   Date Value Ref Range Status   10/17/2024 13 6 - 20 mg/dL Final     Creatinine   Date Value Ref Range Status   10/17/2024 1.0 0.5 - 1.4 mg/dL Final     Calcium   Date Value Ref Range Status   10/17/2024 9.6 8.7 - 10.5 mg/dL Final     Total Protein   Date Value Ref Range Status   10/17/2024 7.5 6.0 - 8.4 g/dL Final     Albumin   Date Value Ref Range Status   10/17/2024 4.3 3.5 - 5.2 g/dL Final     Total Bilirubin   Date Value Ref Range Status   10/17/2024 0.6 0.1 - 1.0 mg/dL Final     Comment:     For infants and newborns, interpretation of results should be based  on gestational age, weight and in agreement with clinical  observations.    Premature Infant recommended reference ranges:  Up to 24 hours.............<8.0 mg/dL  Up to 48 hours............<12.0 mg/dL  3-5 days..................<15.0 mg/dL  6-29 days.................<15.0 mg/dL       Alkaline Phosphatase   Date Value Ref Range Status   10/17/2024 87 40 - 150 U/L Final     AST   Date Value Ref Range Status   10/17/2024 24 10 - 40 U/L Final     ALT   Date Value Ref Range Status   10/17/2024 38 10 - 44 U/L Final     Anion Gap   Date Value Ref Range Status   10/17/2024 8 8 - 16 mmol/L Final     eGFR   Date Value Ref Range Status   10/17/2024 >60.0 >60 mL/min/1.73 m^2 Final       ABG  No results found for: "PH", "PO2", "PCO2"        Personal Diagnostic Review  I have personally reviewed the following data and added my own interpretation as below:  CT Chest 1/9/25 images personally reviewed and shows stable micronodules. No new lesions of concern. No ILD  Cardiology notes reviewed.      1/9/2025     3:46 PM 12/23/2024     8:35 AM 10/18/2024     9:54 AM " "10/17/2024     1:10 PM 7/10/2024     3:08 PM 6/26/2024     2:05 PM 6/14/2024     8:39 AM   Pulmonary Function Tests   SpO2 97 %  98 %  97 %     Height 5' 8" (1.727 m) 5' 8" (1.727 m) 5' 9" (1.753 m) 5' 9" (1.753 m) 5' 8" (1.727 m)  5' 8" (1.727 m)   Weight 97.8 kg (215 lb 9.8 oz) 94.3 kg (208 lb) 94.8 kg (208 lb 15.9 oz) 92.8 kg (204 lb 9.4 oz) 94.5 kg (208 lb 5.4 oz) 93.9 kg (207 lb 0.2 oz) 93.4 kg (206 lb)   BMI (Calculated) 32.8 31.6 30.8 30.2 31.7 31.5 31.3         Assessment:       1. Former cigarette smoker    2. Restrictive lung disease secondary to obesity        Outpatient Encounter Medications as of 1/9/2025   Medication Sig Dispense Refill    chlorpheniramine (CHLOR-TRIMETON) 4 mg tablet Take 1 tablet (4 mg total) by mouth every evening. 90 tablet 3    EScitalopram oxalate (LEXAPRO) 5 MG Tab Take 1 tablet (5 mg total) by mouth once daily. For anxiety 90 tablet 3    furosemide (LASIX) 20 MG tablet Take 1 tablet (20 mg total) by mouth as needed. 30 tablet 11    metoprolol succinate (TOPROL-XL) 100 MG 24 hr tablet Take 1 tablet (100 mg total) by mouth once daily. 90 tablet 3    olopatadine (PATANOL) 0.1 % ophthalmic solution Place 1 drop into both eyes 2 (two) times daily. 5 mL 5    pravastatin (PRAVACHOL) 20 MG tablet Take 1 tablet (20 mg total) by mouth every evening. For cholesterol and heart protection 90 tablet 3    spironolactone (ALDACTONE) 25 MG tablet Take 1 tablet (25 mg total) by mouth once daily. 30 tablet 11    fluticasone propionate (FLONASE) 50 mcg/actuation nasal spray 2 sprays (100 mcg total) by Each Nostril route 2 (two) times daily. 15.8 mL 3     No facility-administered encounter medications on file as of 1/9/2025.     1. Former cigarette smoker  - CT Chest Lung Screening Low Dose; Future    2. Restrictive lung disease secondary to obesity    Plan:     Problem List Items Addressed This Visit          Pulmonary    Restrictive lung disease secondary to obesity    Overview     Due to " obesity or other neuromuscular, extraparenchymal cause. Normal DLCO. PFTs 02/2024         Current Assessment & Plan     Repeat PFTs are normal. Suspect testing error previously. Will not repeat unless changes concerning for ILD on screening LDCT.  -will not limit any interventions needed for his cardiac issues            Other    Former cigarette smoker - Primary    Current Assessment & Plan     Encouraged for complete cessation. Yearly LDCT until age 74, next Jan 2026.         Relevant Orders    CT Chest Lung Screening Low Dose       Please note Overview Notes are historic documentation. Please review A/P for current updates.  No follow-ups on file.    Future Appointments   Date Time Provider Department Center   1/10/2025  2:00 PM Oscar Villa MD [facility]   1/14/2025  9:00 AM LAB, [facility] [place]   1/21/2025 10:00 AM Shirin Brock MD [facility]         Jhon Nava MD

## 2025-01-10 ENCOUNTER — OFFICE VISIT (OUTPATIENT)
Dept: TRANSPLANT | Facility: CLINIC | Age: 60
End: 2025-01-10
Payer: COMMERCIAL

## 2025-01-10 VITALS
SYSTOLIC BLOOD PRESSURE: 144 MMHG | HEART RATE: 82 BPM | BODY MASS INDEX: 32.58 KG/M2 | WEIGHT: 214.94 LBS | DIASTOLIC BLOOD PRESSURE: 68 MMHG | HEIGHT: 68 IN

## 2025-01-10 DIAGNOSIS — I42.2 HYPERTROPHIC CARDIOMYOPATHY: Primary | ICD-10-CM

## 2025-01-10 DIAGNOSIS — I50.32 CHRONIC DIASTOLIC CONGESTIVE HEART FAILURE: ICD-10-CM

## 2025-01-10 PROCEDURE — 3078F DIAST BP <80 MM HG: CPT | Mod: CPTII,S$GLB,, | Performed by: INTERNAL MEDICINE

## 2025-01-10 PROCEDURE — 3077F SYST BP >= 140 MM HG: CPT | Mod: CPTII,S$GLB,, | Performed by: INTERNAL MEDICINE

## 2025-01-10 PROCEDURE — 99999 PR PBB SHADOW E&M-EST. PATIENT-LVL IV: CPT | Mod: PBBFAC,,, | Performed by: INTERNAL MEDICINE

## 2025-01-10 PROCEDURE — 99214 OFFICE O/P EST MOD 30 MIN: CPT | Mod: S$GLB,,, | Performed by: INTERNAL MEDICINE

## 2025-01-10 PROCEDURE — 1159F MED LIST DOCD IN RCRD: CPT | Mod: CPTII,S$GLB,, | Performed by: INTERNAL MEDICINE

## 2025-01-10 PROCEDURE — 3008F BODY MASS INDEX DOCD: CPT | Mod: CPTII,S$GLB,, | Performed by: INTERNAL MEDICINE

## 2025-01-10 RX ORDER — METOPROLOL SUCCINATE 50 MG/1
150 TABLET, EXTENDED RELEASE ORAL DAILY
Qty: 270 TABLET | Refills: 3 | Status: SHIPPED | OUTPATIENT
Start: 2025-01-10 | End: 2026-01-10

## 2025-01-10 NOTE — PATIENT INSTRUCTIONS
You have just the right amount of fluid on you.  Please adhere to a low sodium diet (no more than 1.5 grams of sodium in 24h).  3.   Follow fluid restriction of  1. no more than 2 liters in 24 hours..   4. Increase metoprolol xl from 100 mg daily to 150 mg daily.  5. F/u 3 months with stress echo.  6. Call us in 1 week for increase in metoprolol. Please be ready to provide heart rate.

## 2025-01-10 NOTE — PROGRESS NOTES
[facility] Follow up.      Attending Physician: Oscar Irving MD.  The patient's last visit with me was on 10/17/2024.         HPI.  Shai Olvera is a 59 y.o. male from [place] who presents for evaulation of HCM.  He had symptoms of dyspnea and had echocardiogram. The finding of asymmetric hypertrophy and GARO rised the concern for HCM. He underwent cardiac MRI. It confirmed LVH, worse in the septum, with MWT 21 mm.      An EKG showed fairly marked lateral T-wave inversion.  He underwent cardiac catheterization, this showed normal coronary arteries.     Other co morbidities: He is a smoker.  Continues to smoke.  Hypertension.  Hypercholesterolemia is treated with moderate dose statin therapy.    January 10, 2025: patient came with wife. He had several questions about the results f his echo and Holter monitor.    Review of Systems   Constitutional:  Negative for chills, diaphoresis and fever.   HENT:  Negative for nasal congestion, rhinorrhea and sore throat.    Eyes:  Negative for visual disturbance.   Respiratory:  Positive for shortness of breath.    Cardiovascular:  Negative for chest pain.   Gastrointestinal:  Negative for abdominal pain, diarrhea, nausea and vomiting.   Genitourinary:  Negative for difficulty urinating, dysuria and hematuria.   Integumentary:  Negative for rash.   Neurological:  Negative for seizures, syncope and light-headedness.   Psychiatric/Behavioral:  Negative for agitation and hallucinations.         No past medical history on file.     Past Surgical History:   Procedure Laterality Date    CLOSURE DEVICE  2/23/2024    Procedure: Placement of Closure Device;  Surgeon: Shai Alfred MD;  Location: [facility] CATH LAB/EP;  Service: Cardiology;;    CORONARY ANGIOGRAPHY N/A 2/23/2024    Procedure: ANGIOGRAM, CORONARY ARTERY;  Surgeon: Shai Alfred,  "MD;  Location: [facility] CATH LAB/EP;  Service: Cardiology;  Laterality: N/A;    EYE SURGERY      LEFT HEART CATHETERIZATION Right 2/23/2024    Procedure: Left heart cath;  Surgeon: Shai Alfred MD;  Location: [facility] CATH LAB/EP;  Service: Cardiology;  Laterality: Right;        Family History   Problem Relation Name Age of Onset    Hypertension Brother          Review of patient's allergies indicates:  No Known Allergies     Current Outpatient Medications   Medication Instructions    chlorpheniramine (CHLOR-TRIMETON) 4 mg, Oral, Nightly    EScitalopram oxalate (LEXAPRO) 5 mg, Oral, Daily, For anxiety    fluticasone propionate (FLONASE) 100 mcg, Each Nostril, 2 times daily    furosemide (LASIX) 20 mg, Oral, As needed (PRN)    metoprolol succinate (TOPROL-XL) 100 mg, Oral, Daily    olopatadine (PATANOL) 0.1 % ophthalmic solution 1 drop, Both Eyes, 2 times daily    pravastatin (PRAVACHOL) 20 mg, Oral, Nightly, For cholesterol and heart protection    spironolactone (ALDACTONE) 25 mg, Oral, Daily        Vitals:    01/10/25 1401   BP: (!) 144/68   Pulse: 82        Wt Readings from Last 3 Encounters:   01/10/25 97.5 kg (214 lb 15.2 oz)   01/09/25 97.8 kg (215 lb 9.8 oz)   12/23/24 94.3 kg (208 lb)     Temp Readings from Last 3 Encounters:   02/23/24 97.6 °F (36.4 °C) (Tympanic)   11/25/23 97.9 °F (36.6 °C) (Oral)     BP Readings from Last 3 Encounters:   01/10/25 (!) 144/68   01/09/25 129/77   10/18/24 (!) 118/58     Pulse Readings from Last 3 Encounters:   01/10/25 82   01/09/25 81   10/18/24 78        Body mass index is 32.68 kg/m². Estimated body surface area is 2.16 meters squared as calculated from the following:    Height as of this encounter: 5' 8" (1.727 m).    Weight as of this encounter: 97.5 kg (214 lb 15.2 oz).     Physical Exam  Constitutional:       Appearance: He is well-developed.   HENT:      Head: Normocephalic and atraumatic.      Right Ear: External ear normal.      Left Ear: External ear normal. "   Eyes:      Conjunctiva/sclera: Conjunctivae normal.      Pupils: Pupils are equal, round, and reactive to light.   Neck:      Vascular: No JVD.   Cardiovascular:      Rate and Rhythm: Normal rate and regular rhythm.      Pulses: Intact distal pulses.      Heart sounds: S1 normal and S2 normal. Murmur heard.      Blowing mid to late systolic murmur is present with a grade of 3/6 at the upper left sternal border. The intensity increases with valsalva.      No friction rub. No gallop.   Pulmonary:      Effort: Pulmonary effort is normal.      Breath sounds: Normal breath sounds.   Abdominal:      General: Bowel sounds are normal. There is no distension.      Palpations: Abdomen is soft.      Tenderness: There is no abdominal tenderness. There is no guarding or rebound.   Musculoskeletal:      Cervical back: Normal range of motion and neck supple.      Right lower leg: No edema.      Left lower leg: No edema.   Neurological:      Mental Status: He is alert and oriented to person, place, and time.          Lab Results   Component Value Date     10/17/2024    K 3.9 10/17/2024    MG 2.2 10/17/2024     10/17/2024    CO2 27 10/17/2024    BUN 13 10/17/2024    CREATININE 1.0 10/17/2024    GLU 93 10/17/2024    HGBA1C 5.5 01/10/2024    AST 24 10/17/2024    ALT 38 10/17/2024    ALBUMIN 4.3 10/17/2024    PROT 7.5 10/17/2024    BILITOT 0.6 10/17/2024    WBC 11.12 10/17/2024    HGB 15.0 10/17/2024    HCT 44.0 10/17/2024     10/17/2024    TSH 0.878 10/17/2024    CHOL 172 01/10/2024    HDL 32 (L) 01/10/2024    LDLCALC 108.0 01/10/2024    TRIG 160 (H) 01/10/2024           Results for orders placed during the hospital encounter of 06/14/24    Echo    Interpretation Summary    Left Ventricle: The left ventricle is normal in size. Normal wall thickness. There is severe concentric hypertrophy. There is hyperdynamic systolic function with a visually estimated ejection fraction of 75 - 80%.    Right Ventricle: Normal  "right ventricular cavity size. Wall thickness is normal. Systolic function is normal.    Aortic Valve: The aortic valve is a trileaflet valve.    Mitral Valve: There is no stenosis. There is mild regurgitation.    Pulmonic Valve: There is no stenosis.    Pulmonary Artery: The estimated pulmonary artery systolic pressure is 30 mmHg.    IVC/SVC: Normal venous pressure at 3 mmHg.    There is severe concentric left ventricular hypertrophy without significant outflow tract gradient.  Global strain appears to be decreased with apical sparing.  Would consider workup for infiltrative myopathy with cardiac MRI.        Results for orders placed during the hospital encounter of 02/23/24    Cardiac catheterization    Conclusion    The estimated blood loss was none.    POST CATH NOTE    HPI:    s/p catheterization secondary to: Positive stress test and cardiac chest pain    Cath Results:  Access: US guided RRA  LM: large, 0% stenosis, ALBERTINA 3- flow  LAD: moderate, 0% stenosis, ALBERTINA 3- flow  LCx:  moderate, 0% stenosis, ALBERTINA 3- flow  RCA: moderate, 0% stenosis, ALBERTINA 3- flow  LVEDP 33    Closure device: Vasc Band  Patient tolerated procedure well, no complications    Post Cath Exam:  /73 (BP Location: Left arm, Patient Position: Lying)   Pulse 74   Temp 97.6 °F (36.4 °C) (Tympanic)   Resp 16   Ht 5' 8" (1.727 m)   Wt 91.2 kg (201 lb)   SpO2 98%   BMI 30.56 kg/m²    Assessment:  No evidence of obstructive CAD  LVEDP 33 mmHg      Plan:  - Patient tolerated procedure well. No immediate complications  - Routine post-cath care  - Routine post cath protocol  - Maximize medical management  - Give a lasix 20 IVP x1         Assessment and Plan:  There are no diagnoses linked to this encounter.      Hypertrophic Cardiomyopathy, II - minor symptoms, stage C.     Complications:  Stage C diastolic heart failure: No  LVOT obstruction:  YES.  Peak pressure gradient: 67 mmHg.  Supraventricular tachycardia: No.  Atrial fibrillation:   " No.  Ventricular Tachycardia, SCD or ICD shocks: No     Risk Stratification for SCD.  -Personal history of syncope:  No  -Family history of SCD:   No  -Maximal wall thickness (MWT) >/= 3 .0 cm:   No.  MWT 2.1 cm.  -LVEF < 50%:   No.  -NSVT on Holter monitoring:   No.  -Apical Aneurysm with fibrosis:   No.  -Myocardial fibrosis on Cardiac MRI:   No    Percentage 0.     Recommendation for ICD:   No.     Surveillance tests:     -Last exercise echo (date): 4/14/2025  -Last Holter monitoring (date): 10/24/2024  -Last cardiac MRI (date): 9/3/2024     Plan:  -Increase metoprolol xl from 100 mg daily to 150 mg daily.  -F/u 3 months with stress echo.  -patient was asked to call us in 1 week for increase in metoprolol.      2. Tobacco abuse. He quit smoking.

## 2025-01-31 ENCOUNTER — TELEPHONE (OUTPATIENT)
Dept: TRANSPLANT | Facility: CLINIC | Age: 60
End: 2025-01-31
Payer: COMMERCIAL

## 2025-02-04 ENCOUNTER — TELEPHONE (OUTPATIENT)
Dept: TRANSPLANT | Facility: CLINIC | Age: 60
End: 2025-02-04
Payer: COMMERCIAL

## 2025-02-04 NOTE — TELEPHONE ENCOUNTER
"I spoke to Mr. Olvera this morning and asked him if he's been checking his blood pressure and heart rate.  He stated he had not.  I encouraged him to check and please let us know.  He said "I'm okay -  I know my  limits."  I told him that I would give him a call in al few days to check and see how he is.  Mr. Olvera verbalized understanding.    "

## 2025-02-27 ENCOUNTER — TELEPHONE (OUTPATIENT)
Dept: TRANSPLANT | Facility: CLINIC | Age: 60
End: 2025-02-27
Payer: COMMERCIAL

## 2025-02-27 ENCOUNTER — TELEPHONE (OUTPATIENT)
Dept: GENETICS | Facility: CLINIC | Age: 60
End: 2025-02-27
Payer: COMMERCIAL

## 2025-02-27 NOTE — TELEPHONE ENCOUNTER
----- Message from Govind Doran sent at 2/27/2025  1:32 PM CST -----  Regarding: RE: HOCM genetic testing  Zuleika Dom,Any chance we can try reaching this patient again about scheduling GC only with me? Virtual or in-person is okay, believe my next available is 04/03.Let me know if we get his voicemail again, I can send a portal message about calling back to schedule in that case.Thank you!Best,Govind  ----- Message -----  From: Sonia Thomas RN  Sent: 2/27/2025  11:11 AM CST  To: Govind Doran, [facility] [place]  Subject: HOCM genetic testing                             Penelope Winston'll have probably reached out to this patient for genetic testing.  Would ya'll mind asking him to schedule once again?  He has an appointment with Dr. Smith in April, and Dr. Smith has requested it be done.Thank you so much,Sonia

## 2025-02-27 NOTE — TELEPHONE ENCOUNTER
Spoke with daughter of pt to schedule appt who informed that she or her dad will call back to schedule appt.     ----- Message from Govind Doran sent at 2/27/2025  1:32 PM CST -----  Regarding: RE: HOCM genetic testing  Zuleika Dom,Any chance we can try reaching this patient again about scheduling GC only with me? Virtual or in-person is okay, believe my next available is 04/03.Let me know if we get his voicemail again, I can send a portal message about calling back to schedule in that case.Thank you!Best,Govind  ----- Message -----  From: Sonia Thomas RN  Sent: 2/27/2025  11:11 AM CST  To: Govind Doran, [facility] [place]  Subject: HOCM genetic testing                             Penelope Winston'll have probably reached out to this patient for genetic testing.  Would ya'll mind asking him to schedule once again?  He has an appointment with Dr. Smith in April, and Dr. Smith has requested it be done.Thank you so much,Sonia

## 2025-03-05 ENCOUNTER — TELEPHONE (OUTPATIENT)
Dept: TRANSPLANT | Facility: CLINIC | Age: 60
End: 2025-03-05
Payer: COMMERCIAL

## 2025-03-05 ENCOUNTER — PATIENT MESSAGE (OUTPATIENT)
Dept: GENETICS | Facility: CLINIC | Age: 60
End: 2025-03-05
Payer: COMMERCIAL

## 2025-03-05 NOTE — TELEPHONE ENCOUNTER
----- Message from Govind Doran sent at 3/5/2025  1:56 PM CST -----  Regarding: RE: HOCM genetic testing  Zuleika Scott,Looks like my MA ended up reaching his daughter and informed them about her or Mr. Olvera calling back to schedule. I just sent him a [employer] message as well about calling to schedule. Govind  ----- Message -----  From: Sonia Thomas RN  Sent: 3/5/2025  11:41 AM CST  To: Govind Doran [facility] [place]  Subject: HOCM genetic testing                             SIMBA Faust,Do you know if Mr. Olvera is interested in genetic testing?  I believe Dr. Smith would like for him to be tested, I just wasn't sure if you had been able to get in touch with him.Thank you!Sonia

## 2025-03-10 ENCOUNTER — PATIENT MESSAGE (OUTPATIENT)
Dept: FAMILY MEDICINE | Facility: CLINIC | Age: 60
End: 2025-03-10

## 2025-03-14 ENCOUNTER — TELEPHONE (OUTPATIENT)
Dept: TRANSPLANT | Facility: CLINIC | Age: 60
End: 2025-03-14
Payer: COMMERCIAL

## 2025-03-14 NOTE — TELEPHONE ENCOUNTER
----- Message from Govind Doran sent at 3/14/2025 10:36 AM CDT -----  Regarding: RE: HOCM genetic testing  Zuleika Scott,Unfortunately we've not been able to hear from this patient at all about scheduling an appointment for genetic testing. Do you happen to know if there's a better contact number for trying to reach him?Govind  ----- Message -----  From: Sonia Thomas RN  Sent: 3/14/2025   9:49 AM CDT  To: Govind Doran [facility] [place]  Subject: HOCM genetic testing                             Hi Govind,Do you know if this patient would be open to genetic testing?  Dr. Smith would like for him to be tested for HOCM.Thank you,Sonia

## 2025-03-18 ENCOUNTER — TELEPHONE (OUTPATIENT)
Dept: GENETICS | Facility: CLINIC | Age: 60
End: 2025-03-18

## 2025-03-18 NOTE — TELEPHONE ENCOUNTER
Spoke with pt to schedule genetics appt. Pt did not wish to schedule appt at this time due to work. He did ask that he be contacted the 2nd week in April in regards to scheduling appt.       ----- Message from Govind Doran sent at 3/18/2025  2:58 PM CDT -----  Regarding: RE: HOCM genetic testing  Zuleika López, can we try to reach this patient one last time about trying to schedule with me for a GC only visit? Sonia - if we don't reach him this time, would Dr. Smith be open to speaking with him during his appointment on 4/14 about seeing genetics and I could try adding him on for later that day while he's on campus if he was interested (okay to send me an urgent staff message or a secure chat if he was)? Let me know your thoughts.Govind  ----- Message -----  From: Sonia Thomas RN  Sent: 3/14/2025  11:07 AM CDT  To: Govind Doran [facility]  Subject: RE: HOCM genetic testing                         Zuleika Tammiesteve,We don't have any additional contact info.  It can be challenging to get a hold of him.  Thank you for trying.  Sonia  ----- Message -----  From: Govind Doran [facility]  Sent: 3/14/2025  10:39 AM CDT  To: Sonia Thomas RN  Subject: RE: HOCM genetic testing                         Zuleika BeardSonia,Unfortunately we've not been able to hear from this patient at all about scheduling an appointment for genetic testing. Do you happen to know if there's a better contact number for trying to reach him?Govind  ----- Message -----  From: Sonia Thomas RN  Sent: 3/14/2025   9:49 AM CDT  To: Govind Doran [facility]  Subject: HOCM genetic testing                             Emiliano Faust,Do you know if this patient would be open to genetic testing?  Dr. Smith would like for him to be tested for HOCM.Thank you,Sonia

## 2025-03-20 ENCOUNTER — TELEPHONE (OUTPATIENT)
Dept: CARDIOLOGY | Facility: CLINIC | Age: 60
End: 2025-03-20

## 2025-03-20 NOTE — TELEPHONE ENCOUNTER
----- Message from Med Assistant Castro sent at 3/18/2025  3:13 PM CDT -----  Regarding: RE: HOCM genetic testing  Hi,Just spoke with pt. He did not wish to schedule appt at this time due to work. He did ask that he be contacted the 2nd week in April in regards to scheduling appt. Dom  ----- Message -----  From: Govind Doran CGC  Sent: 3/18/2025   3:07 PM CDT  To: Sonia Thomas RN; [facility] Staff  Subject: RE: HOCM genetic testing                         Zuleika Rene, can we try to reach this patient one last time about trying to schedule with me for a GC only visit? Sonia - if we don't reach him this time, would Dr. Smith be open to speaking with him during his appointment on 4/14 about seeing genetics and I could try adding him on for later that day while he's on campus if he was interested (okay to send me an urgent staff message or a secure chat if he was)? Let me know your thoughts.Govind  ----- Message -----  From: Sonia Thomas RN  Sent: 3/14/2025  11:07 AM CDT  To: Govind Doran [facility]  Subject: RE: HOCM genetic testing                         Zuleika Govind,We don't have any additional contact info.  It can be challenging to get a hold of him.  Thank you for trying.  Sonia  ----- Message -----  From: Govind Doran C[facility]  Sent: 3/14/2025  10:39 AM CDT  To: Sonia Thomas RN  Subject: RE: HOCM genetic testing                         Zuleika Scott,Unfortunately we've not been able to hear from this patient at all about scheduling an appointment for genetic testing. Do you happen to know if there's a better contact number for trying to reach him?Govind  ----- Message -----  From: Sonia Thomas RN  Sent: 3/14/2025   9:49 AM CDT  To: Govind Doran [facility]  Subject: HOCM genetic testing                             Emiliano Faust,Do you know if this patient would be open to genetic testing?  Dr. Smith would like for him to be tested for HOCM.Thank you,Sonia

## 2025-04-10 ENCOUNTER — TELEPHONE (OUTPATIENT)
Dept: TRANSPLANT | Facility: CLINIC | Age: 60
End: 2025-04-10
Payer: COMMERCIAL

## 2025-04-14 ENCOUNTER — HOSPITAL ENCOUNTER (OUTPATIENT)
Dept: CARDIOLOGY | Facility: HOSPITAL | Age: 60
Discharge: HOME | End: 2025-04-14
Attending: INTERNAL MEDICINE
Payer: COMMERCIAL

## 2025-04-14 ENCOUNTER — OFFICE VISIT (OUTPATIENT)
Dept: TRANSPLANT | Facility: CLINIC | Age: 60
End: 2025-04-14
Payer: COMMERCIAL

## 2025-04-14 VITALS
DIASTOLIC BLOOD PRESSURE: 60 MMHG | BODY MASS INDEX: 33.01 KG/M2 | SYSTOLIC BLOOD PRESSURE: 128 MMHG | HEART RATE: 71 BPM | HEIGHT: 68 IN | WEIGHT: 217.81 LBS

## 2025-04-14 VITALS — WEIGHT: 210 LBS | BODY MASS INDEX: 31.83 KG/M2 | HEIGHT: 68 IN

## 2025-04-14 DIAGNOSIS — I42.2 HYPERTROPHIC CARDIOMYOPATHY: Primary | ICD-10-CM

## 2025-04-14 DIAGNOSIS — I42.2 HYPERTROPHIC CARDIOMYOPATHY: ICD-10-CM

## 2025-04-14 LAB
ASCENDING AORTA: 3.28 CM
AV AREA BY CONTINUOUS VTI: 2 CM2
AV INDEX (PROSTH): 0.6
AV LVOT MEAN GRADIENT: 3 MMHG
AV LVOT PEAK GRADIENT: 5 MMHG
AV MEAN GRADIENT: 8 MMHG
AV PEAK GRADIENT: 14 MMHG
AV VALVE AREA BY VELOCITY RATIO: 5.6 CM²
AV VALVE AREA: 2.1 CM²
AV VELOCITY RATIO: 1.63
BSA FOR ECHO PROCEDURE: 2.14 M2
CV ECHO LV RWT: 0.48 CM
CV STRESS BASE HR: 72 BPM
DIASTOLIC BLOOD PRESSURE: 81 MMHG
DOP CALC AO PEAK VEL: 1.9 M/S
DOP CALC AO VTI: 42.3 CM
DOP CALC LVOT AREA: 3.5 CM2
DOP CALC LVOT DIAMETER: 2.1 CM
DOP CALC LVOT PEAK VEL: 3.1 M/S
DOP CALC LVOT STROKE VOLUME: 88.3 CM3
DOP CALCLVOT PEAK VEL VTI: 25.5 CM
E WAVE DECELERATION TIME: 208 MS
E/A RATIO: 1.21
E/E' RATIO: 17 M/S
ECHO EF ESTIMATED: 70 %
ECHO LV POSTERIOR WALL: 1.1 CM (ref 0.6–1.1)
EJECTION FRACTION: 70 %
FRACTIONAL SHORTENING: 39.1 % (ref 28–44)
INTERVENTRICULAR SEPTUM: 1.6 CM (ref 0.6–1.1)
IVC DIAMETER: 1.02 CM
LA MAJOR: 5.7 CM
LA MINOR: 5.7 CM
LA WIDTH: 5.3 CM
LEFT ATRIUM SIZE: 2.8 CM
LEFT ATRIUM VOLUME INDEX MOD: 53 ML/M2
LEFT ATRIUM VOLUME INDEX: 34 ML/M2
LEFT ATRIUM VOLUME MOD: 110 ML
LEFT ATRIUM VOLUME: 72 CM3
LEFT INTERNAL DIMENSION IN SYSTOLE: 2.8 CM (ref 2.1–4)
LEFT VENTRICLE DIASTOLIC VOLUME INDEX: 47.37 ML/M2
LEFT VENTRICLE DIASTOLIC VOLUME: 99 ML
LEFT VENTRICLE MASS INDEX: 116.4 G/M2
LEFT VENTRICLE SYSTOLIC VOLUME INDEX: 14.4 ML/M2
LEFT VENTRICLE SYSTOLIC VOLUME: 30 ML
LEFT VENTRICULAR INTERNAL DIMENSION IN DIASTOLE: 4.6 CM (ref 3.5–6)
LEFT VENTRICULAR MASS: 243.3 G
LEFT VENTRICULAR OUTFLOW TRACT PEAK GRADIENT REST: 39 MMHG
LV LATERAL E/E' RATIO: 12.1
LV SEPTAL E/E' RATIO: 28.3
MV A" WAVE DURATION": 111.32 MS
MV PEAK A VEL: 0.7 M/S
MV PEAK E VEL: 0.85 M/S
OHS CV CPX 1 MINUTE RECOVERY HEART RATE: 89 BPM
OHS CV CPX 85 PERCENT MAX PREDICTED HEART RATE MALE: 137
OHS CV CPX ESTIMATED METS: 7
OHS CV CPX MAX PREDICTED HEART RATE: 161
OHS CV CPX PATIENT IS FEMALE: 0
OHS CV CPX PATIENT IS MALE: 1
OHS CV CPX PEAK DIASTOLIC BLOOD PRESSURE: 63 MMHG
OHS CV CPX PEAK HEAR RATE: 101 BPM
OHS CV CPX PEAK RATE PRESSURE PRODUCT: ABNORMAL
OHS CV CPX PEAK SYSTOLIC BLOOD PRESSURE: 130 MMHG
OHS CV CPX PERCENT MAX PREDICTED HEART RATE ACHIEVED: 63
OHS CV CPX RATE PRESSURE PRODUCT PRESENTING: 9720
OHS CV RV/LV RATIO: 0.67 CM
PISA TR MAX VEL: 2.2 M/S
PULM VEIN A" WAVE DURATION": 111.32 MS
PULM VEIN S/D RATIO: 0.86
PULMONIC VEIN PEAK A VELOCITY: 0.2 M/S
PV PEAK D VEL: 0.74 M/S
PV PEAK S VEL: 0.64 M/S
RA MAJOR: 4.71 CM
RA PRESSURE ESTIMATED: 3 MMHG
RA WIDTH: 3.7 CM
RIGHT ATRIAL AREA: 10.6 CM2
RIGHT VENTRICLE DIASTOLIC BASEL DIMENSION: 3.1 CM
RV TB RVSP: 5 MMHG
RV TISSUE DOPPLER FREE WALL SYSTOLIC VELOCITY 1 (APICAL 4 CHAMBER VIEW): 12.31 CM/S
SINUS: 3.49 CM
STJ: 2.85 CM
STRESS ECHO POST EXERCISE DUR MIN: 4 MINUTES
STRESS ECHO POST EXERCISE DUR SEC: 39 SECONDS
SYSTOLIC BLOOD PRESSURE: 135 MMHG
TDI LATERAL: 0.07 M/S
TDI SEPTAL: 0.03 M/S
TDI: 0.05 M/S
TRICUSPID ANNULAR PLANE SYSTOLIC EXCURSION: 1.72 CM
TV PEAK GRADIENT: 19 MMHG
TV REST PULMONARY ARTERY PRESSURE: 22 MMHG
Z-SCORE OF LEFT VENTRICULAR DIMENSION IN END DIASTOLE: -3.38
Z-SCORE OF LEFT VENTRICULAR DIMENSION IN END SYSTOLE: -2.7

## 2025-04-14 PROCEDURE — 99999 PR PBB SHADOW E&M-EST. PATIENT-LVL III: CPT | Mod: PBBFAC,,, | Performed by: INTERNAL MEDICINE

## 2025-04-14 PROCEDURE — 93351 STRESS TTE COMPLETE: CPT | Mod: 26,,, | Performed by: INTERNAL MEDICINE

## 2025-04-14 PROCEDURE — 93325 DOPPLER ECHO COLOR FLOW MAPG: CPT

## 2025-04-14 PROCEDURE — 3008F BODY MASS INDEX DOCD: CPT | Mod: CPTII,S$GLB,, | Performed by: INTERNAL MEDICINE

## 2025-04-14 PROCEDURE — 3074F SYST BP LT 130 MM HG: CPT | Mod: CPTII,S$GLB,, | Performed by: INTERNAL MEDICINE

## 2025-04-14 PROCEDURE — 1159F MED LIST DOCD IN RCRD: CPT | Mod: CPTII,S$GLB,, | Performed by: INTERNAL MEDICINE

## 2025-04-14 PROCEDURE — 93320 DOPPLER ECHO COMPLETE: CPT | Mod: 26,,, | Performed by: INTERNAL MEDICINE

## 2025-04-14 PROCEDURE — 93325 DOPPLER ECHO COLOR FLOW MAPG: CPT | Mod: 26,,, | Performed by: INTERNAL MEDICINE

## 2025-04-14 PROCEDURE — 99214 OFFICE O/P EST MOD 30 MIN: CPT | Mod: S$GLB,,, | Performed by: INTERNAL MEDICINE

## 2025-04-14 PROCEDURE — 3078F DIAST BP <80 MM HG: CPT | Mod: CPTII,S$GLB,, | Performed by: INTERNAL MEDICINE

## 2025-04-14 RX ORDER — MAVACAMTEN 5 MG/1
5 CAPSULE, GELATIN COATED ORAL DAILY
Qty: 30 CAPSULE | Refills: 0 | Status: ACTIVE | OUTPATIENT
Start: 2025-04-14 | End: 2026-04-14

## 2025-04-14 RX ORDER — METOPROLOL SUCCINATE 200 MG/1
200 TABLET, EXTENDED RELEASE ORAL DAILY
Qty: 90 TABLET | Refills: 3 | Status: SHIPPED | OUTPATIENT
Start: 2025-04-14 | End: 2026-04-14

## 2025-04-14 NOTE — PROGRESS NOTES
[facility] Follow up.      Attending Physician: Oscar Irving MD.  The patient's last visit with me was on 1/10/2025.         HPI.  Shai Olvera is a 59 y.o. male from [place] who presents for evaulation of HCM.  He had symptoms of dyspnea and had echocardiogram. The finding of asymmetric hypertrophy and GARO rised the concern for HCM. He underwent cardiac MRI. It confirmed LVH, worse in the septum, with MWT 21 mm.      An EKG showed fairly marked lateral T-wave inversion.  He underwent cardiac catheterization, this showed normal coronary arteries.     Other co morbidities: He is a smoker.  Continues to smoke.  Hypertension.  Hypercholesterolemia is treated with moderate dose statin therapy.     January 10, 2025: patient came with wife. He had several questions about the results f his echo and Holter monitor.    April 14, 2025: patient continues to have dyspnea on exertion, fatigue. He did not feel better after increasing metoprolol.     Review of Systems   Constitutional:  Positive for activity change and fatigue. Negative for chills, diaphoresis and fever.   HENT:  Negative for nasal congestion, rhinorrhea and sore throat.    Eyes:  Negative for visual disturbance.   Respiratory:  Positive for shortness of breath.    Cardiovascular:  Negative for chest pain.   Gastrointestinal:  Negative for abdominal pain, diarrhea, nausea and vomiting.   Genitourinary:  Negative for difficulty urinating, dysuria and hematuria.   Integumentary:  Negative for rash.   Neurological:  Negative for seizures, syncope and light-headedness.   Psychiatric/Behavioral:  Negative for agitation and hallucinations.         No past medical history on file.     Past Surgical History:   Procedure Laterality Date    CLOSURE DEVICE  2/23/2024    Procedure: Placement of Closure Device;  Surgeon: Shai Alfred,  "MD;  Location: [facility] CATH LAB/EP;  Service: Cardiology;;    CORONARY ANGIOGRAPHY N/A 2/23/2024    Procedure: ANGIOGRAM, CORONARY ARTERY;  Surgeon: Shai Alfred MD;  Location: [facility] CATH LAB/EP;  Service: Cardiology;  Laterality: N/A;    EYE SURGERY      LEFT HEART CATHETERIZATION Right 2/23/2024    Procedure: Left heart cath;  Surgeon: Shai Alfred MD;  Location: [facility] CATH LAB/EP;  Service: Cardiology;  Laterality: Right;        Family History   Problem Relation Name Age of Onset    Hypertension Brother          Review of patient's allergies indicates:  No Known Allergies     Current Outpatient Medications   Medication Instructions    chlorpheniramine (CHLOR-TRIMETON) 4 mg, Oral, Nightly    EScitalopram oxalate (LEXAPRO) 5 mg, Oral, Daily, For anxiety    fluticasone propionate (FLONASE) 100 mcg, Each Nostril, 2 times daily    furosemide (LASIX) 20 mg, Oral, As needed (PRN)    metoprolol succinate (TOPROL-XL) 150 mg, Oral, Daily    olopatadine (PATANOL) 0.1 % ophthalmic solution 1 drop, Both Eyes, 2 times daily    pravastatin (PRAVACHOL) 20 mg, Oral, Nightly, For cholesterol and heart protection    spironolactone (ALDACTONE) 25 mg, Oral, Daily        Vitals:    04/14/25 0915   BP: 128/60   Pulse: 71        Wt Readings from Last 3 Encounters:   04/14/25 98.8 kg (217 lb 13 oz)   04/14/25 95.3 kg (210 lb)   01/10/25 97.5 kg (214 lb 15.2 oz)     Temp Readings from Last 3 Encounters:   02/23/24 97.6 °F (36.4 °C) (Tympanic)   11/25/23 97.9 °F (36.6 °C) (Oral)     BP Readings from Last 3 Encounters:   04/14/25 128/60   01/10/25 (!) 144/68   01/09/25 129/77     Pulse Readings from Last 3 Encounters:   04/14/25 71   01/10/25 82   01/09/25 81        Body mass index is 33.12 kg/m². Estimated body surface area is 2.18 meters squared as calculated from the following:    Height as of this encounter: 5' 8" (1.727 m).    Weight as of this encounter: 98.8 kg (217 lb 13 oz).     Physical Exam  Constitutional:       " Appearance: He is well-developed.   HENT:      Head: Normocephalic and atraumatic.      Right Ear: External ear normal.      Left Ear: External ear normal.   Eyes:      Conjunctiva/sclera: Conjunctivae normal.      Pupils: Pupils are equal, round, and reactive to light.   Neck:      Vascular: No hepatojugular reflux or JVD.   Cardiovascular:      Rate and Rhythm: Normal rate and regular rhythm.      Pulses: Intact distal pulses.      Heart sounds: S1 normal and S2 normal. Murmur heard.      Systolic murmur is present with a grade of 3/6 at the upper right sternal border.      No friction rub. No gallop.   Pulmonary:      Effort: Pulmonary effort is normal.      Breath sounds: Normal breath sounds.   Abdominal:      General: Bowel sounds are normal. There is no distension.      Palpations: Abdomen is soft.      Tenderness: There is no abdominal tenderness. There is no guarding or rebound.   Musculoskeletal:      Cervical back: Normal range of motion and neck supple.      Right lower leg: No edema.      Left lower leg: No edema.   Neurological:      Mental Status: He is alert and oriented to person, place, and time.          Lab Results   Component Value Date     10/17/2024    K 3.9 10/17/2024    MG 2.2 10/17/2024     10/17/2024    CO2 27 10/17/2024    BUN 13 10/17/2024    CREATININE 1.0 10/17/2024    GLU 93 10/17/2024    HGBA1C 5.5 01/10/2024    AST 24 10/17/2024    ALT 38 10/17/2024    ALBUMIN 4.3 10/17/2024    PROT 7.5 10/17/2024    BILITOT 0.6 10/17/2024    WBC 11.12 10/17/2024    HGB 15.0 10/17/2024    HCT 44.0 10/17/2024     10/17/2024    TSH 0.878 10/17/2024    CHOL 172 01/10/2024    HDL 32 (L) 01/10/2024    LDLCALC 108.0 01/10/2024    TRIG 160 (H) 01/10/2024       Results for orders placed during the hospital encounter of 06/14/24    Echo    Interpretation Summary    Left Ventricle: The left ventricle is normal in size. Normal wall thickness. There is severe concentric hypertrophy. There is  "hyperdynamic systolic function with a visually estimated ejection fraction of 75 - 80%.    Right Ventricle: Normal right ventricular cavity size. Wall thickness is normal. Systolic function is normal.    Aortic Valve: The aortic valve is a trileaflet valve.    Mitral Valve: There is no stenosis. There is mild regurgitation.    Pulmonic Valve: There is no stenosis.    Pulmonary Artery: The estimated pulmonary artery systolic pressure is 30 mmHg.    IVC/SVC: Normal venous pressure at 3 mmHg.    There is severe concentric left ventricular hypertrophy without significant outflow tract gradient.  Global strain appears to be decreased with apical sparing.  Would consider workup for infiltrative myopathy with cardiac MRI.        Results for orders placed during the hospital encounter of 02/23/24    Cardiac catheterization    Conclusion    The estimated blood loss was none.    POST CATH NOTE    HPI:    s/p catheterization secondary to: Positive stress test and cardiac chest pain    Cath Results:  Access: US guided RRA  LM: large, 0% stenosis, ALBERTINA 3- flow  LAD: moderate, 0% stenosis, ALBERTINA 3- flow  LCx:  moderate, 0% stenosis, ALBERTINA 3- flow  RCA: moderate, 0% stenosis, ALBERTINA 3- flow  LVEDP 33    Closure device: Vasc Band  Patient tolerated procedure well, no complications    Post Cath Exam:  /73 (BP Location: Left arm, Patient Position: Lying)   Pulse 74   Temp 97.6 °F (36.4 °C) (Tympanic)   Resp 16   Ht 5' 8" (1.727 m)   Wt 91.2 kg (201 lb)   SpO2 98%   BMI 30.56 kg/m²    Assessment:  No evidence of obstructive CAD  LVEDP 33 mmHg      Plan:  - Patient tolerated procedure well. No immediate complications  - Routine post-cath care  - Routine post cath protocol  - Maximize medical management  - Give a lasix 20 IVP x1         Assessment and Plan:  Hypertrophic cardiomyopathy  -     Holter monitor - 48 hour; Future; Expected date: 10/14/2025    Other orders  -     metoprolol succinate (TOPROL-XL) 200 MG 24 hr tablet; " Take 1 tablet (200 mg total) by mouth once daily.  Dispense: 90 tablet; Refill: 3          Hypertrophic Cardiomyopathy, II - minor symptoms, stage C.     Complications:  Stage C diastolic heart failure: No  LVOT obstruction:  YES.  Peak pressure gradient: 67 mmHg.  Supraventricular tachycardia: No.  Atrial fibrillation:   No.  Ventricular Tachycardia, SCD or ICD shocks: No     Risk Stratification for SCD.  -Personal history of syncope:  No  -Family history of SCD:   No  -Maximal wall thickness (MWT) >/= 3 .0 cm:   No.  MWT 2.1 cm.  -LVEF < 50%:   No.  -NSVT on Holter monitoring:   No.  -Apical Aneurysm with fibrosis:   No.  -Myocardial fibrosis on Cardiac MRI:   No    Percentage 0.     Recommendation for ICD:   No.     Surveillance tests:     -Last exercise echo (date): 4/14/2025  -Last Holter monitoring (date): 10/24/2024  -Last cardiac MRI (date): 9/3/2024    Today, April 14, 2025: exercise echo shows poor exercise tolerance, hypotensive responsive to exercise and persistent LVOTO (peak 62 mmHg) on metoprolol xl 150 mg daily.      Plan:  -Increase metoprolol xl from 150 mg to 200 mg daily.  -start enrollment in REM program for mavacamten.  -he has been referred to genetic testing.  -Holter monitor ordered for 6 months from now (once a year screening).       2. Tobacco abuse. He quit smoking 6 months ago.

## 2025-04-21 ENCOUNTER — TELEPHONE (OUTPATIENT)
Dept: TRANSPLANT | Facility: CLINIC | Age: 60
End: 2025-04-21
Payer: COMMERCIAL

## 2025-04-21 NOTE — TELEPHONE ENCOUNTER
Forwarded the present message to [facility] for assistance.     Modesta Carr RN  [facility] Nurse Navigator    ----- Message from Natalie sent at 4/21/2025  8:34 AM CDT -----  Regarding: Prior Ravinder Betts from [facility] [phone] opt [phone] is calling to check the status of a Prio Auth for this medication.Thank you

## 2025-04-25 ENCOUNTER — DOCUMENTATION ONLY (OUTPATIENT)
Dept: TRANSPLANT | Facility: CLINIC | Age: 60
End: 2025-04-25
Payer: COMMERCIAL

## 2025-04-25 NOTE — NURSING
Shai Reyesgas was evaluated Dr. Smith on 4/14/25 for HCM/HOCM. Baseline Echo was performed on 4/14/25 with exercise stress echo: EF: 70%, LVOT: 62.   Camzyos 5 mg mg prescribed by Dr. Smith  on 4/14/25. Rx sent to [facility] for prior authorization and patient assistance. Camzyos & Rems education  completed with BMS & Remsenrollment forms signed and faxed.    Education was provided to the patient regarding the following key points of HCM/ REMS/ MyCamzyos programs with patient:  What is HCM?    Camzyos Medication Information    Enrollment in the REMS Program     Use of Specialty Pharmacy    Mandatory follow-up echocardiograms    Must communicate to HCM RN with the date medication is started.HCM RN will ensure.    Echocardiograms are scheduled at appropriate intervals from the medication start date.    Explained that patient MUST attend all scheduled Echocardiogram appointments.    Echocardiogram appointments will be scheduled at designated intervals while patient is taking Camzyos.    If patient does not attend their scheduled Echocardiogram appointment, their Camzyos prescription will NOT be refilled.  Patient was instructed to call: develops syncope and/or s/s of heart failure such as:  shortness of breath,   chest pain, fatigue,   swelling in legs,   rapid weight gain,   palpitations  dizziness   Patient has met the following criteria for a prescription of Camzyos/Mavacamten at this time and will continue to be managed by HCM.[facility] Clinical Nurse Coordinator:     This medication is being certified Marcelaamaxel Rems Certified Cardiologist. yes    Patient's left ventricular ejection fraction is > 55%. If the patient is transitioning from on stable does, LVEF>50% is appropriate. yes    Patient does not experience a serious intercurrent illness (e.g., serious infection) or arrhythmia (atrial fibrillation or other uncontrolled tachyarrhythmia are at greater risk of developing systolic dysfunction and heart  failure. yes  Patient is not on concomitant use of Camzyos on disopyramide, ranolazine, verapamil with a beta blocker or diltiazem with a beta blocker. yes     Patient's clinical status and LVEF prior to and regularly during treatment and adjust Camzyos accordingly. New or worsening arrhythmia, dyspnea, chest pain, fatigue, palpitations, leg edema, or elevations in NT Pro BNP may be signs of heart failure and should also prompt an evaluation of cardiac function.yes      Patient is not currently taking any moderate to strong FDD5Y97 inhibitors (PPI use Protonix only & for H2 blocker all are safe except for cimetidine) or strong LLS977 inhibitors.yes     Patient reminded not to consume grapefruit juice while on Camzyos as it will interfere with absorption. yes     Inform healthcare providers of all the prescriptions and over the counter medications and supplements taken. yes     Will attend all scheduled echo's during treatment with Camzyos as directed. yes     Confirmed Patient is not pregnant. Patients are advised to use a contraceptive method that may include intrauterine system or ad non hormonal contraception such as condom during concomitant use and for 4 months after the last dose of Camzyos. NA        Patient encouraged to read information again once at home and reach out with any additional questions. Questions and concerns were sought and answered to the patient's stated verbal satisfaction. The patient verbalizes understanding and agreement with the above stated treatment plan.     Modesta Carr RN  [facility] Nurse Navigator

## 2025-05-30 ENCOUNTER — TELEPHONE (OUTPATIENT)
Dept: TRANSPLANT | Facility: CLINIC | Age: 60
End: 2025-05-30
Payer: COMMERCIAL

## 2025-05-30 ENCOUNTER — TELEPHONE (OUTPATIENT)
Dept: PHARMACY | Facility: CLINIC | Age: 60
End: 2025-05-30
Payer: COMMERCIAL

## 2025-05-30 DIAGNOSIS — I42.2 HYPERTROPHIC CARDIOMYOPATHY: Primary | ICD-10-CM

## 2025-05-31 NOTE — TELEPHONE ENCOUNTER
[facility]/Population Health Chart Review & Patient Outreach Details For Medication Adherence Project    Reason for Outreach Encounter: 3rd Party payor non-compliance report ([employer], [employer], [facility], etc)  2.  Patient Outreach Method: Redeemiahart message  3.   Medication in question: pravastatin    LAST FILLED: 10/17/24 for 90 day supply  Hyperlipidemia Medications              pravastatin (PRAVACHOL) 20 MG tablet Take 1 tablet (20 mg total) by mouth every evening. For cholesterol and heart protection               4.  Reviewed and or Updates Made To: Patient Chart  5. Outreach Outcomes and/or actions taken: Sent inquiry to patient: Waiting for response.

## 2025-06-20 DIAGNOSIS — I42.2 HYPERTROPHIC CARDIOMYOPATHY: ICD-10-CM

## 2025-06-20 RX ORDER — MAVACAMTEN 5 MG/1
5 CAPSULE, GELATIN COATED ORAL DAILY
Qty: 30 CAPSULE | Refills: 0 | Status: CANCELLED | OUTPATIENT
Start: 2025-06-20 | End: 2026-06-20

## 2025-06-23 DIAGNOSIS — I42.2 HYPERTROPHIC CARDIOMYOPATHY: Primary | ICD-10-CM

## 2025-07-03 ENCOUNTER — TELEPHONE (OUTPATIENT)
Dept: TRANSPLANT | Facility: CLINIC | Age: 60
End: 2025-07-03
Payer: COMMERCIAL

## 2025-07-03 ENCOUNTER — HOSPITAL ENCOUNTER (OUTPATIENT)
Dept: CARDIOLOGY | Facility: HOSPITAL | Age: 60
Discharge: HOME | End: 2025-07-03
Attending: INTERNAL MEDICINE
Payer: COMMERCIAL

## 2025-07-03 ENCOUNTER — CLINICAL SUPPORT (OUTPATIENT)
Dept: CARDIOLOGY | Facility: HOSPITAL | Age: 60
End: 2025-07-03
Attending: INTERNAL MEDICINE
Payer: COMMERCIAL

## 2025-07-03 VITALS
WEIGHT: 217.81 LBS | BODY MASS INDEX: 33.01 KG/M2 | HEART RATE: 69 BPM | SYSTOLIC BLOOD PRESSURE: 154 MMHG | DIASTOLIC BLOOD PRESSURE: 80 MMHG | HEIGHT: 68 IN

## 2025-07-03 DIAGNOSIS — I42.2 HYPERTROPHIC CARDIOMYOPATHY: Primary | ICD-10-CM

## 2025-07-03 DIAGNOSIS — I42.2 HYPERTROPHIC CARDIOMYOPATHY: ICD-10-CM

## 2025-07-03 LAB
AORTIC SIZE INDEX (SOV): 1.6 CM/M2
AORTIC SIZE INDEX: 1.4 CM/M2
ASCENDING AORTA: 2.9 CM
AV AREA BY CONTINUOUS VTI: 4 CM2
AV INDEX (PROSTH): 1.03
AV LVOT MEAN GRADIENT: 7 MMHG
AV LVOT PEAK GRADIENT: 10 MMHG
AV MEAN GRADIENT: 6 MMHG
AV PEAK GRADIENT: 9 MMHG
AV VALVE AREA BY VELOCITY RATIO: 4.1 CM²
AV VALVE AREA: 3.9 CM2
AV VELOCITY RATIO: 1.07
BSA FOR ECHO PROCEDURE: 2.18 M2
CV ECHO LV RWT: 0.38 CM
DOP CALC AO PEAK VEL: 1.5 M/S
DOP CALC AO VTI: 38.5 CM
DOP CALC LVOT AREA: 3.8 CM2
DOP CALC LVOT DIAMETER: 2.2 CM
DOP CALC LVOT PEAK VEL: 1.6 M/S
DOP CALC LVOT STROKE VOLUME: 151.2 CM3
DOP CALCLVOT PEAK VEL VTI: 39.8 CM
E WAVE DECELERATION TIME: 237 MS
E/A RATIO: 1.43
E/E' RATIO: 15 M/S
ECHO EF ESTIMATED: 73 %
ECHO LV POSTERIOR WALL: 0.9 CM (ref 0.6–1.1)
FRACTIONAL SHORTENING: 41.7 % (ref 28–44)
INTERVENTRICULAR SEPTUM: 1.8 CM (ref 0.6–1.1)
IVC DIAMETER: 1.08 CM
LA MAJOR: 6.5 CM
LA MINOR: 6.4 CM
LA WIDTH: 5 CM
LEFT ATRIUM SIZE: 5.6 CM
LEFT ATRIUM VOLUME INDEX MOD: 48 ML/M2
LEFT ATRIUM VOLUME INDEX: 72 ML/M2
LEFT ATRIUM VOLUME MOD: 102 ML
LEFT ATRIUM VOLUME: 154 CM3
LEFT INTERNAL DIMENSION IN SYSTOLE: 2.8 CM (ref 2.1–4)
LEFT VENTRICLE DIASTOLIC VOLUME INDEX: 49.53 ML/M2
LEFT VENTRICLE DIASTOLIC VOLUME: 105 ML
LEFT VENTRICLE MASS INDEX: 122.4 G/M2
LEFT VENTRICLE SYSTOLIC VOLUME INDEX: 13.7 ML/M2
LEFT VENTRICLE SYSTOLIC VOLUME: 29 ML
LEFT VENTRICULAR INTERNAL DIMENSION IN DIASTOLE: 4.8 CM (ref 3.5–6)
LEFT VENTRICULAR MASS: 259.6 G
LV LATERAL E/E' RATIO: 11.4
LV SEPTAL E/E' RATIO: 20.6
MV A" WAVE DURATION": 156.99 MS
MV PEAK A VEL: 0.72 M/S
MV PEAK E VEL: 1.03 M/S
OHS CV RV/LV RATIO: 0.83 CM
PISA TR MAX VEL: 2.9 M/S
PULM VEIN A" WAVE DURATION": 156.99 MS
PULM VEIN S/D RATIO: 1.4
PULMONIC VEIN PEAK A VELOCITY: 0.4 M/S
PV PEAK D VEL: 0.42 M/S
PV PEAK S VEL: 0.59 M/S
RA MAJOR: 5.19 CM
RA PRESSURE ESTIMATED: 3 MMHG
RA WIDTH: 3.87 CM
RIGHT ATRIAL AREA: 17.1 CM2
RIGHT VENTRICLE DIASTOLIC BASEL DIMENSION: 4 CM
RV TB RVSP: 6 MMHG
RV TISSUE DOPPLER FREE WALL SYSTOLIC VELOCITY 1 (APICAL 4 CHAMBER VIEW): 9.65 CM/S
SINUS: 3.3 CM
STJ: 2.8 CM
TDI LATERAL: 0.09 M/S
TDI SEPTAL: 0.05 M/S
TDI: 0.07 M/S
TRICUSPID ANNULAR PLANE SYSTOLIC EXCURSION: 1.4 CM
TV PEAK GRADIENT: 34 MMHG
TV REST PULMONARY ARTERY PRESSURE: 37 MMHG
Z-SCORE OF LEFT VENTRICULAR DIMENSION IN END DIASTOLE: -3.33
Z-SCORE OF LEFT VENTRICULAR DIMENSION IN END SYSTOLE: -2.99

## 2025-07-03 PROCEDURE — 93306 TTE W/DOPPLER COMPLETE: CPT | Mod: 26,,, | Performed by: INTERNAL MEDICINE

## 2025-07-03 PROCEDURE — 93226 XTRNL ECG REC<48 HR SCAN A/R: CPT

## 2025-07-03 PROCEDURE — 93306 TTE W/DOPPLER COMPLETE: CPT
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
## 2025-07-07 ENCOUNTER — TELEPHONE (OUTPATIENT)
Dept: PHARMACY | Facility: CLINIC | Age: 60
End: 2025-07-07
Payer: COMMERCIAL

## 2025-07-07 NOTE — TELEPHONE ENCOUNTER
[facility]/Population Health Chart Review & Patient Outreach Details For Medication Adherence Project    Reason for Outreach Encounter: 3rd Party payor non-compliance report ([employer], [employer], C, etc)  2.  Patient Outreach Method: Reviewed patient chart   3.   Medication in question:    Hyperlipidemia Medications              pravastatin (PRAVACHOL) 20 MG tablet Take 1 tablet (20 mg total) by mouth every evening. For cholesterol and heart protection                  pravastatin   last filled  10/17/24 for 90 day supply    4.  Reviewed and or Updates Made To: Patient Chart  5. Outreach Outcomes and/or actions taken: Medication discontinued  Additional Notes:   Pt reported not taking 5/22/25

## 2025-07-24 ENCOUNTER — TELEPHONE (OUTPATIENT)
Dept: TRANSPLANT | Facility: CLINIC | Age: 60
End: 2025-07-24
Payer: COMMERCIAL

## 2025-07-24 NOTE — TELEPHONE ENCOUNTER
Shai Olvera cancelled his Echo x 2 for this REMS date.  I called him and left a voicemail explaining to him we have to have the Echo completed in order to comply with the REMS program so he can receive his Camzyos refills.  I asked him to call me back directly so we can reschedule.

## 2025-07-31 ENCOUNTER — TELEPHONE (OUTPATIENT)
Dept: TRANSPLANT | Facility: CLINIC | Age: 60
End: 2025-07-31
Payer: COMMERCIAL

## 2025-07-31 NOTE — TELEPHONE ENCOUNTER
Shai Olvera has cancelled his last two Echo appointments without notifying the office or rescheduling.  He is almost out of Camzyos per [facility].  They are administering a 5 day dose to help hold him over until his next Echo is completed.  It is currently scheduled for 08/06/25 at 5:30 pm.  Voicemail left notifying patient and appointment reminder mailed to his home.

## 2025-08-06 ENCOUNTER — HOSPITAL ENCOUNTER (OUTPATIENT)
Dept: CARDIOLOGY | Facility: HOSPITAL | Age: 60
Discharge: HOME | End: 2025-08-06
Attending: INTERNAL MEDICINE
Payer: COMMERCIAL

## 2025-08-06 VITALS
DIASTOLIC BLOOD PRESSURE: 72 MMHG | BODY MASS INDEX: 32.89 KG/M2 | HEART RATE: 82 BPM | WEIGHT: 217 LBS | HEIGHT: 68 IN | SYSTOLIC BLOOD PRESSURE: 140 MMHG

## 2025-08-06 DIAGNOSIS — I42.2 HYPERTROPHIC CARDIOMYOPATHY: ICD-10-CM

## 2025-08-06 PROCEDURE — 93306 TTE W/DOPPLER COMPLETE: CPT | Mod: 26,,, | Performed by: INTERNAL MEDICINE

## 2025-08-06 PROCEDURE — 93306 TTE W/DOPPLER COMPLETE: CPT

## 2025-08-07 DIAGNOSIS — I42.2 HYPERTROPHIC CARDIOMYOPATHY: Primary | ICD-10-CM

## 2025-08-07 LAB
AORTIC SIZE INDEX (SOV): 1.4 CM/M2
AORTIC SIZE INDEX: 1.3 CM/M2
ASCENDING AORTA: 2.7 CM
BSA FOR ECHO PROCEDURE: 2.17 M2
CV ECHO LV RWT: 0.62 CM
E WAVE DECELERATION TIME: 198 MS
E/A RATIO: 1.44
E/E' RATIO: 15 M/S
ECHO EF ESTIMATED: 83 %
ECHO LV POSTERIOR WALL: 1.3 CM (ref 0.6–1.1)
FRACTIONAL SHORTENING: 52.4 % (ref 28–44)
INTERVENTRICULAR SEPTUM: 1.5 CM (ref 0.6–1.1)
IVC DIAMETER: 1.45 CM
LA MAJOR: 6 CM
LA MINOR: 5.7 CM
LA WIDTH: 4.7 CM
LEFT ATRIUM SIZE: 5.1 CM
LEFT ATRIUM VOLUME INDEX MOD: 48 ML/M2
LEFT ATRIUM VOLUME INDEX: 56 ML/M2
LEFT ATRIUM VOLUME MOD: 102 ML
LEFT ATRIUM VOLUME: 119 CM3
LEFT INTERNAL DIMENSION IN SYSTOLE: 2 CM (ref 2.1–4)
LEFT VENTRICLE DIASTOLIC VOLUME INDEX: 36.32 ML/M2
LEFT VENTRICLE DIASTOLIC VOLUME: 77 ML
LEFT VENTRICLE MASS INDEX: 105.8 G/M2
LEFT VENTRICLE SYSTOLIC VOLUME INDEX: 6.1 ML/M2
LEFT VENTRICLE SYSTOLIC VOLUME: 13 ML
LEFT VENTRICULAR INTERNAL DIMENSION IN DIASTOLE: 4.2 CM (ref 3.5–6)
LEFT VENTRICULAR MASS: 224.3 G
LV LATERAL E/E' RATIO: 11.5
LV SEPTAL E/E' RATIO: 23
Lab: 1.6 CM/M
Lab: 1.7 CM/M
MV PEAK A VEL: 0.8 M/S
MV PEAK E VEL: 1.15 M/S
OHS CV CPX PATIENT HEIGHT IN: 68
OHS CV RV/LV RATIO: 0.9 CM
PISA TR MAX VEL: 2.7 M/S
RA MAJOR: 5.27 CM
RA PRESSURE ESTIMATED: 3 MMHG
RA WIDTH: 4.23 CM
RIGHT ATRIAL AREA: 19.8 CM2
RIGHT VENTRICLE DIASTOLIC BASEL DIMENSION: 3.8 CM
RV TB RVSP: 6 MMHG
RV TISSUE DOPPLER FREE WALL SYSTOLIC VELOCITY 1 (APICAL 4 CHAMBER VIEW): 8.27 CM/S
SINUS: 2.9 CM
STJ: 2.5 CM
TDI LATERAL: 0.1 M/S
TDI SEPTAL: 0.05 M/S
TDI: 0.08 M/S
TRICUSPID ANNULAR PLANE SYSTOLIC EXCURSION: 1.6 CM
TV PEAK GRADIENT: 29 MMHG
TV REST PULMONARY ARTERY PRESSURE: 32 MMHG
Z-SCORE OF LEFT VENTRICULAR DIMENSION IN END DIASTOLE: -4.67
Z-SCORE OF LEFT VENTRICULAR DIMENSION IN END SYSTOLE: -5.65

## 2025-08-11 ENCOUNTER — TELEPHONE (OUTPATIENT)
Dept: TRANSPLANT | Facility: CLINIC | Age: 60
End: 2025-08-11
Payer: COMMERCIAL

## 2025-08-11 DIAGNOSIS — I42.2 HYPERTROPHIC CARDIOMYOPATHY: Primary | ICD-10-CM

## 2025-08-18 ENCOUNTER — TELEPHONE (OUTPATIENT)
Dept: TRANSPLANT | Facility: CLINIC | Age: 60
End: 2025-08-18
Payer: COMMERCIAL

## 2025-08-18 ENCOUNTER — PATIENT OUTREACH (OUTPATIENT)
Dept: ADMINISTRATIVE | Facility: HOSPITAL | Age: 60
End: 2025-08-18
Payer: COMMERCIAL

## 2025-08-18 DIAGNOSIS — Z12.5 ENCOUNTER FOR SCREENING PROSTATE SPECIFIC ANTIGEN (PSA) MEASUREMENT: Primary | ICD-10-CM

## 2025-08-26 ENCOUNTER — HOSPITAL ENCOUNTER (OUTPATIENT)
Dept: CARDIOLOGY | Facility: HOSPITAL | Age: 60
Discharge: HOME | End: 2025-08-26
Attending: INTERNAL MEDICINE
Payer: COMMERCIAL

## 2025-08-26 VITALS
BODY MASS INDEX: 32.89 KG/M2 | HEART RATE: 82 BPM | DIASTOLIC BLOOD PRESSURE: 71 MMHG | WEIGHT: 217 LBS | SYSTOLIC BLOOD PRESSURE: 128 MMHG | HEIGHT: 68 IN

## 2025-08-26 DIAGNOSIS — I42.2 HYPERTROPHIC CARDIOMYOPATHY: ICD-10-CM

## 2025-08-26 PROCEDURE — 93356 MYOCRD STRAIN IMG SPCKL TRCK: CPT

## 2025-08-26 PROCEDURE — 93306 TTE W/DOPPLER COMPLETE: CPT | Mod: 26,,, | Performed by: INTERNAL MEDICINE

## 2025-08-26 PROCEDURE — 93356 MYOCRD STRAIN IMG SPCKL TRCK: CPT | Mod: ,,, | Performed by: INTERNAL MEDICINE

## 2025-08-27 LAB
AORTIC SIZE INDEX (SOV): 1.5 CM/M2
AORTIC SIZE INDEX: 1.4 CM/M2
ASCENDING AORTA: 2.9 CM
AV AREA BY CONTINUOUS VTI: 2.7 CM2
AV INDEX (PROSTH): 0.87
AV LVOT MEAN GRADIENT: 11 MMHG
AV LVOT PEAK GRADIENT: 17 MMHG
AV MEAN GRADIENT: 12 MMHG
AV PEAK GRADIENT: 19 MMHG
AV VALVE AREA BY VELOCITY RATIO: 3 CM²
AV VALVE AREA: 2.7 CM2
AV VELOCITY RATIO: 0.95
BSA FOR ECHO PROCEDURE: 2.17 M2
CV ECHO LV RWT: 0.6 CM
DOP CALC AO PEAK VEL: 2.2 M/S
DOP CALC AO VTI: 48.8 CM
DOP CALC LVOT AREA: 3.1 CM2
DOP CALC LVOT DIAMETER: 2 CM
DOP CALC LVOT PEAK VEL: 2.1 M/S
DOP CALCLVOT PEAK VEL VTI: 42.6 CM
E WAVE DECELERATION TIME: 214 MS
E/A RATIO: 0.95
E/E' RATIO: 15 M/S
ECHO EF ESTIMATED: 68 %
ECHO LV POSTERIOR WALL: 1.2 CM (ref 0.6–1.1)
EJECTION FRACTION: 73 %
FRACTIONAL SHORTENING: 37.5 % (ref 28–44)
GLOBAL LONGITUIDAL STRAIN: 14 %
INTERVENTRICULAR SEPTUM: 1.5 CM (ref 0.6–1.1)
LA MAJOR: 5.8 CM
LA MINOR: 5.5 CM
LA WIDTH: 4.2 CM
LEFT ATRIUM SIZE: 4.4 CM
LEFT ATRIUM VOLUME INDEX MOD: 42 ML/M2
LEFT ATRIUM VOLUME INDEX: 42 ML/M2
LEFT ATRIUM VOLUME MOD: 88 ML
LEFT ATRIUM VOLUME: 89 CM3
LEFT INTERNAL DIMENSION IN SYSTOLE: 2.5 CM (ref 2.1–4)
LEFT VENTRICLE DIASTOLIC VOLUME INDEX: 33.02 ML/M2
LEFT VENTRICLE DIASTOLIC VOLUME: 70 ML
LEFT VENTRICLE MASS INDEX: 93.2 G/M2
LEFT VENTRICLE SYSTOLIC VOLUME INDEX: 10.4 ML/M2
LEFT VENTRICLE SYSTOLIC VOLUME: 22 ML
LEFT VENTRICULAR INTERNAL DIMENSION IN DIASTOLE: 4 CM (ref 3.5–6)
LEFT VENTRICULAR MASS: 197.6 G
LV LATERAL E/E' RATIO: 11.7
LV SEPTAL E/E' RATIO: 20.5
Lab: 1.7 CM/M
Lab: 1.8 CM/M
MV A" WAVE DURATION": 111.32 MS
MV PEAK A VEL: 0.86 M/S
MV PEAK E VEL: 0.82 M/S
OHS CV CPX PATIENT HEIGHT IN: 68
OHS CV RV/LV RATIO: 0.88 CM
PISA TR MAX VEL: 2.5 M/S
PULM VEIN A" WAVE DURATION": 111.32 MS
PULM VEIN S/D RATIO: 1.43
PULMONIC VEIN PEAK A VELOCITY: 0.4 M/S
PV PEAK D VEL: 0.51 M/S
PV PEAK S VEL: 0.73 M/S
RA MAJOR: 5.12 CM
RA PRESSURE ESTIMATED: 3 MMHG
RA WIDTH: 3.7 CM
RIGHT ATRIAL AREA: 17.9 CM2
RIGHT VENTRICLE DIASTOLIC BASEL DIMENSION: 3.5 CM
RV TB RVSP: 6 MMHG
RV TISSUE DOPPLER FREE WALL SYSTOLIC VELOCITY 1 (APICAL 4 CHAMBER VIEW): 11.35 CM/S
SINUS: 3.1 CM
STJ: 2.9 CM
TDI LATERAL: 0.07 M/S
TDI SEPTAL: 0.04 M/S
TDI: 0.06 M/S
TRICUSPID ANNULAR PLANE SYSTOLIC EXCURSION: 2.4 CM
TV PEAK GRADIENT: 25 MMHG
TV REST PULMONARY ARTERY PRESSURE: 28 MMHG
Z-SCORE OF LEFT VENTRICULAR DIMENSION IN END DIASTOLE: -5.16
Z-SCORE OF LEFT VENTRICULAR DIMENSION IN END SYSTOLE: -3.89

## 2025-08-29 DIAGNOSIS — I42.2 HYPERTROPHIC CARDIOMYOPATHY: Primary | ICD-10-CM

## (undated) DEVICE — CONTRAST VISIPAQUE 150ML

## (undated) DEVICE — PAD DEFIB CADENCE ADULT R2

## (undated) DEVICE — KIT LEFT HEART MANIFOLD CUSTOM

## (undated) DEVICE — CATH ANG PIGTAIL 4FR INFINITY

## (undated) DEVICE — KIT GLIDESHEATH SLEND 6FR 10CM

## (undated) DEVICE — COVER PROBE US 5.5X58L NON LTX

## (undated) DEVICE — DRAPE ANGIO BRACH 38X44IN

## (undated) DEVICE — HEMOSTAT VASC BAND REG 24CM

## (undated) DEVICE — CATH OPTITORQUE TIGER 5F 100CM

## (undated) DEVICE — SPIKE SHORT LG BORE 1-WAY 2IN

## (undated) DEVICE — Device

## (undated) DEVICE — GUIDEWIRE EMERALD .035IN 260CM